# Patient Record
Sex: MALE | Race: WHITE | NOT HISPANIC OR LATINO | Employment: FULL TIME | URBAN - METROPOLITAN AREA
[De-identification: names, ages, dates, MRNs, and addresses within clinical notes are randomized per-mention and may not be internally consistent; named-entity substitution may affect disease eponyms.]

---

## 2017-01-05 ENCOUNTER — ALLSCRIPTS OFFICE VISIT (OUTPATIENT)
Dept: OTHER | Facility: OTHER | Age: 57
End: 2017-01-05

## 2017-01-05 DIAGNOSIS — M79.10 MYALGIA: ICD-10-CM

## 2017-01-10 ENCOUNTER — APPOINTMENT (OUTPATIENT)
Dept: PHYSICAL THERAPY | Facility: CLINIC | Age: 57
End: 2017-01-10
Payer: COMMERCIAL

## 2017-01-10 PROCEDURE — 97110 THERAPEUTIC EXERCISES: CPT

## 2017-01-10 PROCEDURE — 97112 NEUROMUSCULAR REEDUCATION: CPT

## 2017-01-10 PROCEDURE — 97162 PT EVAL MOD COMPLEX 30 MIN: CPT

## 2017-01-12 ENCOUNTER — APPOINTMENT (OUTPATIENT)
Dept: PHYSICAL THERAPY | Facility: CLINIC | Age: 57
End: 2017-01-12
Payer: COMMERCIAL

## 2017-01-12 PROCEDURE — 97110 THERAPEUTIC EXERCISES: CPT

## 2017-01-13 ENCOUNTER — GENERIC CONVERSION - ENCOUNTER (OUTPATIENT)
Dept: OTHER | Facility: OTHER | Age: 57
End: 2017-01-13

## 2017-01-16 ENCOUNTER — APPOINTMENT (OUTPATIENT)
Dept: PHYSICAL THERAPY | Facility: CLINIC | Age: 57
End: 2017-01-16
Payer: COMMERCIAL

## 2017-01-16 PROCEDURE — 97140 MANUAL THERAPY 1/> REGIONS: CPT

## 2017-01-16 PROCEDURE — 97110 THERAPEUTIC EXERCISES: CPT

## 2017-01-17 ENCOUNTER — APPOINTMENT (OUTPATIENT)
Dept: PHYSICAL THERAPY | Facility: CLINIC | Age: 57
End: 2017-01-17
Payer: COMMERCIAL

## 2017-01-17 PROCEDURE — 97140 MANUAL THERAPY 1/> REGIONS: CPT

## 2017-01-17 PROCEDURE — 97110 THERAPEUTIC EXERCISES: CPT

## 2017-01-18 ENCOUNTER — GENERIC CONVERSION - ENCOUNTER (OUTPATIENT)
Dept: OTHER | Facility: OTHER | Age: 57
End: 2017-01-18

## 2017-01-19 ENCOUNTER — APPOINTMENT (OUTPATIENT)
Dept: PHYSICAL THERAPY | Facility: CLINIC | Age: 57
End: 2017-01-19
Payer: COMMERCIAL

## 2017-01-19 PROCEDURE — 97140 MANUAL THERAPY 1/> REGIONS: CPT

## 2017-01-19 PROCEDURE — 97110 THERAPEUTIC EXERCISES: CPT

## 2017-01-23 ENCOUNTER — APPOINTMENT (OUTPATIENT)
Dept: PHYSICAL THERAPY | Facility: CLINIC | Age: 57
End: 2017-01-23
Payer: COMMERCIAL

## 2017-01-23 PROCEDURE — 97110 THERAPEUTIC EXERCISES: CPT

## 2017-01-23 PROCEDURE — 97140 MANUAL THERAPY 1/> REGIONS: CPT

## 2017-01-23 PROCEDURE — 97014 ELECTRIC STIMULATION THERAPY: CPT

## 2017-01-25 ENCOUNTER — APPOINTMENT (OUTPATIENT)
Dept: PHYSICAL THERAPY | Facility: CLINIC | Age: 57
End: 2017-01-25
Payer: COMMERCIAL

## 2017-01-25 PROCEDURE — 97110 THERAPEUTIC EXERCISES: CPT

## 2017-01-25 PROCEDURE — 97140 MANUAL THERAPY 1/> REGIONS: CPT

## 2017-01-25 PROCEDURE — 97014 ELECTRIC STIMULATION THERAPY: CPT

## 2017-01-26 ENCOUNTER — APPOINTMENT (OUTPATIENT)
Dept: PHYSICAL THERAPY | Facility: CLINIC | Age: 57
End: 2017-01-26
Payer: COMMERCIAL

## 2017-01-26 PROCEDURE — 97140 MANUAL THERAPY 1/> REGIONS: CPT

## 2017-01-26 PROCEDURE — 97014 ELECTRIC STIMULATION THERAPY: CPT

## 2017-01-26 PROCEDURE — 97110 THERAPEUTIC EXERCISES: CPT

## 2017-01-30 ENCOUNTER — APPOINTMENT (OUTPATIENT)
Dept: PHYSICAL THERAPY | Facility: CLINIC | Age: 57
End: 2017-01-30
Payer: COMMERCIAL

## 2017-01-30 PROCEDURE — 97140 MANUAL THERAPY 1/> REGIONS: CPT

## 2017-01-30 PROCEDURE — 97014 ELECTRIC STIMULATION THERAPY: CPT

## 2017-01-30 PROCEDURE — 97110 THERAPEUTIC EXERCISES: CPT

## 2017-01-31 ENCOUNTER — APPOINTMENT (OUTPATIENT)
Dept: PHYSICAL THERAPY | Facility: CLINIC | Age: 57
End: 2017-01-31
Payer: COMMERCIAL

## 2017-02-01 ENCOUNTER — APPOINTMENT (OUTPATIENT)
Dept: PHYSICAL THERAPY | Facility: CLINIC | Age: 57
End: 2017-02-01
Payer: COMMERCIAL

## 2017-02-01 PROCEDURE — 97110 THERAPEUTIC EXERCISES: CPT

## 2017-02-01 PROCEDURE — 97014 ELECTRIC STIMULATION THERAPY: CPT

## 2017-02-01 PROCEDURE — 97140 MANUAL THERAPY 1/> REGIONS: CPT

## 2017-02-01 RX ORDER — DIPHENOXYLATE HYDROCHLORIDE AND ATROPINE SULFATE 2.5; .025 MG/1; MG/1
1 TABLET ORAL DAILY
COMMUNITY
End: 2018-04-17 | Stop reason: ALTCHOICE

## 2017-02-02 ENCOUNTER — APPOINTMENT (OUTPATIENT)
Dept: PHYSICAL THERAPY | Facility: CLINIC | Age: 57
End: 2017-02-02
Payer: COMMERCIAL

## 2017-02-02 PROCEDURE — 97140 MANUAL THERAPY 1/> REGIONS: CPT

## 2017-02-02 PROCEDURE — 97014 ELECTRIC STIMULATION THERAPY: CPT

## 2017-02-02 PROCEDURE — 97110 THERAPEUTIC EXERCISES: CPT

## 2017-02-06 ENCOUNTER — APPOINTMENT (OUTPATIENT)
Dept: PHYSICAL THERAPY | Facility: CLINIC | Age: 57
End: 2017-02-06
Payer: COMMERCIAL

## 2017-02-06 PROCEDURE — 97110 THERAPEUTIC EXERCISES: CPT

## 2017-02-06 PROCEDURE — 97140 MANUAL THERAPY 1/> REGIONS: CPT

## 2017-02-06 PROCEDURE — 97014 ELECTRIC STIMULATION THERAPY: CPT

## 2017-02-07 ENCOUNTER — GENERIC CONVERSION - ENCOUNTER (OUTPATIENT)
Dept: OTHER | Facility: OTHER | Age: 57
End: 2017-02-07

## 2017-02-07 ENCOUNTER — HOSPITAL ENCOUNTER (OUTPATIENT)
Facility: HOSPITAL | Age: 57
Setting detail: OUTPATIENT SURGERY
Discharge: HOME | End: 2017-02-07
Attending: ANESTHESIOLOGY | Admitting: ANESTHESIOLOGY
Payer: COMMERCIAL

## 2017-02-07 ENCOUNTER — APPOINTMENT (OUTPATIENT)
Dept: RADIOLOGY | Facility: HOSPITAL | Age: 57
End: 2017-02-07
Payer: COMMERCIAL

## 2017-02-07 VITALS
HEIGHT: 71 IN | WEIGHT: 180 LBS | DIASTOLIC BLOOD PRESSURE: 97 MMHG | BODY MASS INDEX: 25.2 KG/M2 | HEART RATE: 67 BPM | TEMPERATURE: 99.6 F | RESPIRATION RATE: 18 BRPM | SYSTOLIC BLOOD PRESSURE: 141 MMHG | OXYGEN SATURATION: 99 %

## 2017-02-07 PROBLEM — M54.16 LUMBAR RADICULOPATHY: Status: ACTIVE | Noted: 2017-02-07

## 2017-02-07 PROCEDURE — 72020 X-RAY EXAM OF SPINE 1 VIEW: CPT

## 2017-02-07 RX ORDER — LIDOCAINE HYDROCHLORIDE 10 MG/ML
INJECTION, SOLUTION INFILTRATION; PERINEURAL AS NEEDED
Status: DISCONTINUED | OUTPATIENT
Start: 2017-02-07 | End: 2017-02-07 | Stop reason: HOSPADM

## 2017-02-07 RX ORDER — METHYLPREDNISOLONE ACETATE 80 MG/ML
INJECTION, SUSPENSION INTRA-ARTICULAR; INTRALESIONAL; INTRAMUSCULAR; SOFT TISSUE AS NEEDED
Status: DISCONTINUED | OUTPATIENT
Start: 2017-02-07 | End: 2017-02-07 | Stop reason: HOSPADM

## 2017-02-07 RX ORDER — METHOCARBAMOL 500 MG/1
500 TABLET, FILM COATED ORAL 3 TIMES DAILY
Status: ON HOLD | COMMUNITY
End: 2017-11-21 | Stop reason: ALTCHOICE

## 2017-02-07 RX ORDER — IBUPROFEN 800 MG/1
800 TABLET ORAL EVERY 6 HOURS PRN
Status: ON HOLD | COMMUNITY
End: 2017-11-21 | Stop reason: ALTCHOICE

## 2017-02-07 RX ORDER — GABAPENTIN 300 MG/1
300 CAPSULE ORAL 3 TIMES DAILY
COMMUNITY
End: 2018-09-04

## 2017-02-07 RX ADMIN — IOHEXOL 49.5 ML: 300 INJECTION, SOLUTION INTRAVENOUS at 12:23

## 2017-02-08 ENCOUNTER — APPOINTMENT (OUTPATIENT)
Dept: PHYSICAL THERAPY | Facility: CLINIC | Age: 57
End: 2017-02-08
Payer: COMMERCIAL

## 2017-02-08 PROCEDURE — 97110 THERAPEUTIC EXERCISES: CPT

## 2017-02-08 PROCEDURE — 97014 ELECTRIC STIMULATION THERAPY: CPT

## 2017-02-09 ENCOUNTER — APPOINTMENT (OUTPATIENT)
Dept: PHYSICAL THERAPY | Facility: CLINIC | Age: 57
End: 2017-02-09
Payer: COMMERCIAL

## 2017-02-15 ENCOUNTER — GENERIC CONVERSION - ENCOUNTER (OUTPATIENT)
Dept: OTHER | Facility: OTHER | Age: 57
End: 2017-02-15

## 2017-03-09 ENCOUNTER — CLINICAL SUPPORT (OUTPATIENT)
Dept: OTHER | Facility: OTHER | Age: 57
End: 2017-03-09

## 2017-04-07 ENCOUNTER — ALLSCRIPTS OFFICE VISIT (OUTPATIENT)
Dept: OTHER | Facility: OTHER | Age: 57
End: 2017-04-07

## 2017-04-12 ENCOUNTER — GENERIC CONVERSION - ENCOUNTER (OUTPATIENT)
Dept: OTHER | Facility: OTHER | Age: 57
End: 2017-04-12

## 2017-04-19 ENCOUNTER — ALLSCRIPTS OFFICE VISIT (OUTPATIENT)
Dept: RADIOLOGY | Facility: CLINIC | Age: 57
End: 2017-04-19
Payer: COMMERCIAL

## 2017-05-23 ENCOUNTER — ALLSCRIPTS OFFICE VISIT (OUTPATIENT)
Dept: OTHER | Facility: OTHER | Age: 57
End: 2017-05-23

## 2017-06-27 ENCOUNTER — GENERIC CONVERSION - ENCOUNTER (OUTPATIENT)
Dept: OTHER | Facility: OTHER | Age: 57
End: 2017-06-27

## 2017-07-21 ENCOUNTER — GENERIC CONVERSION - ENCOUNTER (OUTPATIENT)
Dept: OTHER | Facility: OTHER | Age: 57
End: 2017-07-21

## 2017-09-22 ENCOUNTER — ALLSCRIPTS OFFICE VISIT (OUTPATIENT)
Dept: OTHER | Facility: OTHER | Age: 57
End: 2017-09-22

## 2017-10-27 NOTE — PROGRESS NOTES
Assessment  1  DDD (degenerative disc disease), lumbar (722 52) (M51 36)   2  Lumbar radiculopathy (724 4) (M54 16)   3  Lumbar stenosis (724 02) (M48 06)   4  Myofascial pain (729 1) (M79 1)   5  Sacroiliitis (720 2) (M46 1)   6  Osteoarthritis of lumbar spine with myelopathy (721 42) (M47 16)    Plan  Lumbar radiculopathy    · Gabapentin 400 MG Oral Capsule; TAKE 1 CAPSULE 3 TIMES DAILY   Rx By: Karthik Tang; Dispense: 90 Days ; #:270 Capsule; Refill: 2;For: Lumbar radiculopathy; VEDA = N; Sent To: [facility]    40-year-old male returning for follow-up of lumbar radiculopathy and sacroiliitis  The patient has had very favorable responses to [place] size and SI joint injections in the past, however the patient feels as though his back and lower cavity symptoms are starting to return  The patient does not feel as though the pain is significant enough to repeat the injections though  He is interested in discussing medication options at this time  The patient's major complaint seems to be secondary to his lumbar radiculopathy is he does have a positive straight leg raise bilaterally  He does not have any evidence of sacroiliitis today  There may be a myofascial and facet mediated component to his low back pain however  #1 I will increase the patient's gabapentin to 400 mg 3 times a day for his neuropathic complaints  #2 the patient will continue with diclofenac 50 mg twice a day when necessary and he should avoid any other NSAIDs while on this medication  #3 the patient will continue his Zanaflex 2 mg every 8 hours when necessary for his myofascial pain  #4 I did offer the patient repeat LESI, however he would like to hold off on this for now  #5 we will repeat SI joint injections if the patient sacroiliitis returns  #6 the patient will continue with his home exercise program  #7 I will follow-up the patient in 2-3 months     Chief Complaint  1   Back Pain  Low back and leg p      History of Present Illness  68-year-old male with history of lumbar radiculopathy and sacroiliitis returning for follow-up  The patient did have bilateral SI joint injections and an interlaminar LESI with excellent relief in the past, however the patient feels as though the pain is starting to return  The patient's back pain radiates into his buttocks bilaterally and he also has associated paresthesias in his calves and feet occasionally  The patient denies any bladder or bowel incontinence or saddle anesthesia  The patient continues to take diclofenac 50 mg twice a day when necessary, Zanaflex 2 mg every 8 hours when necessary, and gabapentin 300 mg 3 times a day  The patient does get approximately 40% of relief from the pain medication regimen and he is not expressing any side effects from the medications  patient rates pain a 6-7 out of 10 and the pain is worse in the morning  The pain is intermittent and described as cramping, shooting, and pins and needles  The pain is worse with bending and twisting at the waist, walking, and standing  The pain is alleviated with sitting and lying down have personally reviewed and/or updated the patient's past medical history, past surgical history, family history, social history, allergies, and vital signs today  Other than as stated above, the patient denies any interval changes in medications, medical condition, mental condition, symptoms, or allergies since the last office visit  Anat Gamez presents with complaints of intermittent episodes of bilateral lower back pain  On a scale of 1 to 10, the patient rates the pain as 7  Symptoms are worsening  Review of Systems    Constitutional: no fever,-- no recent weight gain-- and-- no recent weight loss  Eyes: no double vision-- and-- no blurry vision  Cardiovascular: no chest pain,-- no palpitations-- and-- no lower extremity edema  Respiratory: no complaints of shortness of breath-- and-- no wheezing     Musculoskeletal: no difficulty walking,-- no muscle weakness,-- no joint stiffness,-- no joint swelling,-- no limb swelling,-- no pain in extremity-- and-- no decreased range of motion  Neurological: no dizziness,-- no difficulty swallowing,-- no memory loss,-- no loss of consciousness-- and-- no seizures  Gastrointestinal: no nausea,-- no vomiting,-- no constipation-- and-- no diarrhea  Genitourinary: no difficulty initiating urine stream,-- no genital pain-- and-- no frequent urination  Integumentary: no complaints of skin rash  Psychiatric: no depression  Endocrine: no excessive thirst,-- no adrenal disease,-- no hypothyroidism-- and-- no hyperthyroidism  Hematologic/Lymphatic: no tendency for easy bruising-- and-- no tendency for easy bleeding  ROS reviewed  Active Problems  1  Back pain (724 5) (M54 9)   2  DDD (degenerative disc disease), lumbar (722 52) (M51 36)   3  Hyperlipidemia (272 4) (E78 5)   4  Low back pain (724 2) (M54 5)   5  Lumbar radiculopathy (724 4) (M54 16)   6  Lumbar stenosis (724 02) (M48 06)   7  Myofascial pain (729 1) (M79 1)   8  Osteoarthritis of lumbar spine with myelopathy (721 42) (M47 16)   9  Sacroiliitis (720 2) (M46 1)    Past Medical History  1  No pertinent past medical history    Surgical History  1  Denied: History Of Prior Surgery    Social History   · Never a smoker    Current Meds   1  Daily Multivitamin TABS; Therapy: (Recorded:12Jan2016) to Recorded   2  Diclofenac Sodium 50 MG Oral Tablet Delayed Release; TAKE 1 TABLET PO BID prn; Therapy: 07Apr2017 to (Evaluate:23Mar2018)  Requested for: 26Jun2017; Last   CE:[id number] Ordered   3  Gabapentin 300 MG Oral Capsule; TAKE 1 CAPSULE 3 TIMES DAILY; Therapy: 31MMS9163 to (Evaluate:83Plx6901)  Requested for: 33GWN7099; Last   AW:[id number] Ordered   4  Simvastatin 20 MG Oral Tablet; TAKE 1 TABLET DAILY; Therapy: 85VOC7400 to (Evaluate:18Apr2017)  Requested for: 28OOY8885; Last   Rx:[id number] Ordered   5   TiZANidine HCl - 2 MG Oral Tablet; TAKE 1 TABLET EVERY 8 HOURS AS NEEDED; Therapy: 26YCC9747 to (Evaluate:32Ith0209)  Requested for: 33OTS2312; Last   Rx:[id number] Ordered    Allergies  1  No Known Drug Allergies    Vitals  Vital Signs    Recorded: 71QUS3440 03:30PM   Temperature 28 8 F   Systolic 039   Diastolic 74   Weight 655 lb 4 oz   BMI Calculated 25 7   BSA Calculated 2 04   Pain Scale 7     Physical Exam    Constitutional   General appearance: Well developed, well nourished, alert, in no distress, non-toxic and no overt pain behavior  Eyes   Sclera: anicteric   HEENT   Hearing grossly intact  Neck   Neck: Supple, symmetric, trachea midline, no masses  Pulmonary   Respiratory effort: Even and unlabored  Abdomen   Abdomen: Soft, non-tender, non-distended  Skin   Skin and subcutaneous tissue: Normal without rashes or lesions, well hydrated  Psychiatric   Mood and affect: Mood and affect appropriate  Neurologic   the muscle tone was normal   Musculoskeletal   Gait and station: Normal     Lumbar/Sacral Spine examination demonstrates  Bilateral lumbar paraspinals mildly tender to palpation with muscle spasms noted in the lumbar paraspinal musculature  Positive seated straight leg raise bilaterally  Negative Floyd's and Gaenslen's tests bilaterally  Bilateral lower extremity strength 5 out of 5 in all muscle groups  Results/Data  Results Free Text Form [facility]:   Results    I personally reviewed the films/images in the office today  * XR SPINE LUMBAR MINIMUM 4 VIEWS NON INJURY [id number] 11:34AM [facility] Order Number: [id number]     Test Name Result Flag Reference   XR SPINE LUMBAR MINIMUM 4 VIEWS (Report)     LUMBAR SPINE     INDICATION: Back and left SI joint pain     COMPARISON: None     VIEWS: AP, lateral, bilateral oblique and coned down projections; 5 images     FINDINGS:     Alignment is unremarkable  There is minimal dextro scoliosis       There is no radiographic evidence of acute fracture or destructive osseous lesion  No significant disc space narrowing  There is mild spondylosis with small anterior osteophytes throughout the lumbar spine and facet arthrosis primarily in the lower lumbar spine  Visualized soft tissues appear unremarkable  Mild spondylotic changes with lower lumbar facet arthrosis  Slight dextroscoliosis       Future Appointments    Date/Time Provider Specialty Site   12/18/2017 03:00 PM JOSELYN Torres Pain Management [facility] SP[facility]     Signatures   Electronically signed by : Yessica Young DO; Sep 22 2017  5:37PM EST                       (Author)

## 2017-11-06 DIAGNOSIS — M46.1 SACROILIITIS, NOT ELSEWHERE CLASSIFIED (HCC): ICD-10-CM

## 2017-11-06 DIAGNOSIS — E78.5 HYPERLIPIDEMIA: ICD-10-CM

## 2017-11-20 RX ORDER — TIZANIDINE HYDROCHLORIDE 4 MG/1
4 CAPSULE, GELATIN COATED ORAL 3 TIMES DAILY
COMMUNITY
End: 2018-03-29 | Stop reason: SDUPTHER

## 2017-11-21 ENCOUNTER — APPOINTMENT (OUTPATIENT)
Dept: RADIOLOGY | Facility: HOSPITAL | Age: 57
End: 2017-11-21
Payer: COMMERCIAL

## 2017-11-21 ENCOUNTER — APPOINTMENT (OUTPATIENT)
Dept: LAB | Facility: MEDICAL CENTER | Age: 57
End: 2017-11-21
Payer: COMMERCIAL

## 2017-11-21 ENCOUNTER — TRANSCRIBE ORDERS (OUTPATIENT)
Dept: LAB | Facility: MEDICAL CENTER | Age: 57
End: 2017-11-21

## 2017-11-21 ENCOUNTER — HOSPITAL ENCOUNTER (OUTPATIENT)
Facility: HOSPITAL | Age: 57
Setting detail: OUTPATIENT SURGERY
Discharge: HOME | End: 2017-11-21
Attending: ANESTHESIOLOGY | Admitting: ANESTHESIOLOGY
Payer: COMMERCIAL

## 2017-11-21 VITALS
RESPIRATION RATE: 18 BRPM | OXYGEN SATURATION: 96 % | SYSTOLIC BLOOD PRESSURE: 167 MMHG | HEART RATE: 73 BPM | DIASTOLIC BLOOD PRESSURE: 86 MMHG | TEMPERATURE: 98.4 F

## 2017-11-21 DIAGNOSIS — E78.5 HYPERLIPIDEMIA, UNSPECIFIED HYPERLIPIDEMIA TYPE: Primary | ICD-10-CM

## 2017-11-21 DIAGNOSIS — E78.5 HYPERLIPIDEMIA, UNSPECIFIED HYPERLIPIDEMIA TYPE: ICD-10-CM

## 2017-11-21 DIAGNOSIS — E78.5 HYPERLIPIDEMIA: ICD-10-CM

## 2017-11-21 PROBLEM — M75.51 SUBACROMIAL BURSITIS OF RIGHT SHOULDER JOINT: Status: ACTIVE | Noted: 2017-11-21

## 2017-11-21 PROBLEM — M75.52 SUBACROMIAL BURSITIS OF LEFT SHOULDER JOINT: Status: ACTIVE | Noted: 2017-11-21

## 2017-11-21 LAB
CHOLEST SERPL-MCNC: 212 MG/DL (ref 50–200)
HDLC SERPL-MCNC: 59 MG/DL (ref 40–60)
LDLC SERPL CALC-MCNC: 110 MG/DL (ref 0–100)
TRIGL SERPL-MCNC: 216 MG/DL

## 2017-11-21 PROCEDURE — 80061 LIPID PANEL: CPT

## 2017-11-21 PROCEDURE — 72020 X-RAY EXAM OF SPINE 1 VIEW: CPT

## 2017-11-21 RX ORDER — LIDOCAINE HYDROCHLORIDE 10 MG/ML
INJECTION, SOLUTION INFILTRATION; PERINEURAL AS NEEDED
Status: DISCONTINUED | OUTPATIENT
Start: 2017-11-21 | End: 2017-11-21 | Stop reason: HOSPADM

## 2017-11-21 RX ORDER — METHYLPREDNISOLONE ACETATE 80 MG/ML
INJECTION, SUSPENSION INTRA-ARTICULAR; INTRALESIONAL; INTRAMUSCULAR; SOFT TISSUE AS NEEDED
Status: DISCONTINUED | OUTPATIENT
Start: 2017-11-21 | End: 2017-11-21 | Stop reason: HOSPADM

## 2017-11-21 RX ADMIN — IOHEXOL 49 ML: 300 INJECTION, SOLUTION INTRAVENOUS at 13:02

## 2017-11-21 NOTE — DISCHARGE INSTRUCTIONS
ACTIVITY  · Do not drive or operate machinery today  · No strenuous activity today - bending, lifting, etc   · You may resume normal activites starting tomorrow - start slowly and as tolerated  · You may shower today, but no tub baths or hot tubs  · You may have numbness for several hours from the local anesthetic  Please use caution and common sense, especially with weight-bearing activities  CARE OF THE INJECTION SITE  · If you have soreness or pain, apply ice to the area today (20 minutes on/20 minutes off)  · Starting tomorrow, you may use warm, moist heat or ice if needed  · You may have an increase or change in your discomfort for 36-48 hours after your treatment  · Apply ice and continue with any pain medication you have been prescribed  · Notify the [facility] if you have any of the following: redness, drainage, swelling, headache, stiff neck or fever above 100°F     SPECIAL INSTRUCTIONS  · Our office will contact you in approximately 7 days for a progress report  MEDICATIONS  · Continue to take all routine medications  · Our office may have instructed you to hold some medications  If you have a problem specifically related to your procedure, please call our office at [phone]  Problems not related to your procedure should be directed to your primary care physician

## 2017-11-21 NOTE — OP NOTE
OPERATIVE REPORT  PATIENT NAME: Humble Gilbert    :  1960  MRN: [id number]  Pt Location: [facility] OR ROOM 01    SURGERY DATE: 2017    Surgeon(s) and Role:     * DO Fermin Nichols Primary    Preop Diagnosis:  Radiculopathy of lumbar region [M54 16]    Post-Op Diagnosis Codes:     * Radiculopathy of lumbar region [M54 16]    Procedure(s) (LRB):  LUMBAR EPIDURAL STEROID INJECTION (N/A)    Specimen(s):  * No specimens in log *    Estimated Blood Loss:   Minimal    Drains:       Anesthesia Type:   Local    Operative Indications:  Radiculopathy of lumbar region [M54 16]      Operative Findings:  None    Complications:   None    Procedure and Technique:  Fluoroscopically-guided lumbar Interlaminar Epidural Steroid Injection     Indication:  Low back and leg pain  Preoperative diagnosis:  Lumbar radiculitis  Postoperative diagnosis:  Lumbar radiculitis    Procedure: Fluoroscopically-guided L5-S1 interlaminar epidural steroid injection under fluoroscopy      After discussing the risks, benefits, and alternatives to the procedure, the patient expressed understanding and wished to proceed  The patient was brought to the fluoroscopy suite and placed in the prone position  A procedural pause was conducted to verify:  correct patient identity, procedure to be performed and as applicable, correct side and site, correct patient position, and availability of implants, special equipment and special requirements  After identifying the L5-S1 space fluoroscopically, the skin was sterilely prepped and draped in the usual fashion using Chloraprep skin prep  The skin and subcutaneous tissue were anesthetized with 1 % lidocaine  Utilizing a loss of resistance technique and intermittent fluoroscopic guidance, a 3 5 20 gauge Tuohy needle was advanced into the epidural space  Proper needle positioning was confirmed using multiple fluoroscopic views    After negative aspiration, Omnipaque 300 contrast was injected confirming epidural spread without evidence of intravascular or intrathecal spread  A 4 ml solution consisting of 80 mg of Depo-Medrol in sterile saline was injected slowly and incrementally into the epidural space  Following the injection the needle was withdrawn slightly and flushed with 1 % lidocaine as it was fully extracted  The patient tolerated the procedure well and there were no apparent complications  After appropriate observation, the patient was dismissed from the clinic in good condition under their own power               I was present for the entire procedure    Patient Disposition:  PACU     SIGNATURE: Dino Perera DO  DATE: November 21, 2017  TIME: 1:11 PM

## 2017-12-01 ENCOUNTER — GENERIC CONVERSION - ENCOUNTER (OUTPATIENT)
Dept: OTHER | Facility: OTHER | Age: 57
End: 2017-12-01

## 2017-12-18 ENCOUNTER — ALLSCRIPTS OFFICE VISIT (OUTPATIENT)
Dept: OTHER | Facility: OTHER | Age: 57
End: 2017-12-18

## 2017-12-21 ENCOUNTER — ALLSCRIPTS OFFICE VISIT (OUTPATIENT)
Dept: FAMILY MEDICINE CLINIC | Facility: CLINIC | Age: 57
End: 2017-12-21
Payer: COMMERCIAL

## 2017-12-21 PROCEDURE — 99396 PREV VISIT EST AGE 40-64: CPT | Performed by: FAMILY MEDICINE

## 2017-12-23 NOTE — PROGRESS NOTES
Assessment   1  Encounter for preventive health examination (V70 0) (Z00 00)    Chief Complaint   Chief Complaint Free Text Note Form: Patient was seen for a home visit      History of Present Illness   HPI: Pt is her efor well check up and health wellness visit for the year He is eating well and sleeping well and bm are ok denies any chest pain or sob and had colonoscopy and seeing pain mgmt for injections for djdin back      Review of Systems   ROS Reviewed:    ROS reviewed  Active Problems   1  Chronic bilateral low back pain (724 2,338 29) (M54 5,G89 29)   2  DDD (degenerative disc disease), lumbar (722 52) (M51 36)   3  Hyperlipidemia (272 4) (E78 5)   4  Lumbar radiculopathy (724 4) (M54 16)   5  Lumbar stenosis (724 02) (M48 061)   6  Myofascial pain (729 1) (M79 1)   7  Osteoarthritis of lumbar spine with myelopathy (721 42) (M47 16)   8  Sacroiliitis (720 2) (M46 1)    Past Medical History   1  History of back pain (V13 59) (Z87 39)   2  No pertinent past medical history    Surgical History   1  Denied: History Of Prior Surgery  Surgical History Reviewed: The surgical history was reviewed and updated today  Social History    · Never a smoker  Social History Reviewed: The social history was reviewed and updated today  Current Meds    1  Daily Multivitamin TABS; Therapy: (Recorded:12Jan2016) to Recorded   2  Diclofenac Sodium 50 MG Oral Tablet Delayed Release; TAKE 1 TABLET PO BID prn; Therapy: 07Apr2017 to (Evaluate:16Jun2018)  Requested for: 16CUB5039; Last     Rx:[id number] Ordered   3  Gabapentin 400 MG Oral Capsule; TAKE 1 CAPSULE 3 TIMES DAILY; Therapy: 10EJR1184 to (Evaluate:16Jun2018)  Requested for: 59ZOD6666; Last     Rx:[id number] Ordered   4  Simvastatin 20 MG Oral Tablet; TAKE 1 TABLET DAILY; Therapy: 10XTW0327 to (Evaluate:18Apr2017)  Requested for: 97XTN8547; Last     Rx:[id number] Ordered   5  TiZANidine HCl - 2 MG Oral Tablet;  Take 1/2-1 tab BID PRN for spasms; Therapy: 07Apr2017 to (Evaluate:18Mar2018)  Requested for: 20VZT0315; Last     Rx:[id number] Ordered  Medication List Reviewed: The medication list was reviewed and updated today  Allergies   1  No Known Drug Allergies    Vitals   Signs   Recorded: 21Dec2017 10:25AM   Temperature: 98 7 F  Heart Rate: 86  Respiration: 20  Systolic: 079  Diastolic: 76    Physical Exam        Constitutional      General appearance: No acute distress, well appearing and well nourished  Eyes      Conjunctiva and lids: No swelling, erythema, or discharge  Pupils and irises: Equal, round and reactive to light  Ears, Nose, Mouth, and Throat      External inspection of ears and nose: Normal        Otoscopic examination: Tympanic membrance translucent with normal light reflex  Canals patent without erythema  Nasal mucosa, septum, and turbinates: Normal without edema or erythema  Oropharynx: Normal with no erythema, edema, exudate or lesions  Pulmonary      Respiratory effort: No increased work of breathing or signs of respiratory distress  Auscultation of lungs: Clear to auscultation, equal breath sounds bilaterally, no wheezes, no rales, no rhonci  Cardiovascular      Palpation of heart: Normal PMI, no thrills  Auscultation of heart: Normal rate and rhythm, normal S1 and S2, without murmurs  Examination of extremities for edema and/or varicosities: Normal        Carotid pulses: Normal        Abdomen      Abdomen: Non-tender, no masses  Liver and spleen: No hepatomegaly or splenomegaly  Lymphatic      Palpation of lymph nodes in neck: No lymphadenopathy  Musculoskeletal      Gait and station: Normal        Digits and nails: Normal without clubbing or cyanosis  Inspection/palpation of joints, bones, and muscles: Normal        Skin      Skin and subcutaneous tissue: Normal without rashes or lesions         Neurologic      Cranial nerves: Cranial nerves 2-12 intact  Reflexes: 2+ and symmetric  Sensation: No sensory loss  Psychiatric      Orientation to person, place and time: Normal        Mood and affect: Normal           Discussion/Summary   Discussion Summary:    Reviewed albs and discussed diet and exercise and doing well no changes        Future Appointments      Date/Time Provider Specialty Site   04/09/2018 03:00 PM Josefina Galloway, DO Pain Management [facility] SPINE     Signatures    Electronically signed by : Doreen Buckner, Trey Portillo; Dec 21 2017 11:33AM EST                       (Author)     Electronically signed by : Edvin Ludwig MD; Dec 22 2017  2:12PM EST                       (Author)

## 2018-01-09 NOTE — MISCELLANEOUS
Message   Recorded as Task   Date: 02/15/2017 10:10 AM, Created By: Brian Raman   Task Name: Call Back   Assigned To: [facility]   Regarding Patient: Rita Nolasco, Status: Active   Comment:    Brian Raman - 15 Feb 2017 10:10 AM     TASK CREATED  S/P L RIMMA DONE 2/7/17 @ 'S BY Macie Santiago - 15 Feb 2017 10:41 AM     TASK EDITED  Called 8 days post-op, patient unavailable, spoke with wife, she states that the patient is doing much better, no problems or issues  She said that he stopped outpatient physical therapy and is doing a home exercise program on a daily basis  He is scheduled for a follow up apt on 3/9/17 in [place] w/ [address] [place] [address]  She will have him call the office with any questions or concerns prior to his appt  She was appreciative of the call  Active Problems    1  Back pain (724 5) (M54 9)   2  DDD (degenerative disc disease), lumbar (722 52) (M51 36)   3  Hyperlipidemia (272 4) (E78 5)   4  Low back pain (724 2) (M54 5)   5  Lumbar radiculopathy (724 4) (M54 16)   6  Lumbar stenosis (724 02) (M48 06)   7  Myofascial pain (729 1) (M79 1)   8  Osteoarthritis of lumbar spine with myelopathy (721 42) (M47 16)    Current Meds   1  Daily Multivitamin TABS; Therapy: (Recorded:12Jan2016) to Recorded   2  Gabapentin 300 MG Oral Capsule; TAKE 1 CAPSULE 3 TIMES DAILY; Therapy: 96ISN2727 to (Natali Wood)  Requested for: 68UAR4737; Last   Rx:05Jan2017 Ordered   3  Ibuprofen 800 MG Oral Tablet; 1 po tid prn; Therapy: 89EMW8361 to (Last Rx:05Jan2017)  Requested for: 95KBO0439 Ordered   4  Simvastatin 20 MG Oral Tablet; TAKE 1 TABLET DAILY; Therapy: 66GMI4407 to (Evaluate:18Apr2017)  Requested for: 47SQJ8615; Last   Rx:[id number] Ordered    Allergies    1   No Known Drug Allergies    Signatures   Electronically signed by : Emi Tomas, ; Feb 15 2017 10:41AM EST                       (Author)

## 2018-01-10 NOTE — PROGRESS NOTES
Assessment    1  Encounter for preventive health examination (V70 0) (Z00 00)    Plan  Back pain    · Meloxicam 7 5 MG Oral Tablet; TAKE 1 TO 2 TABLETS DAILY  Health Maintenance    · Carisoprodol 250 MG Oral Tablet; TAKE 1 TABLET 4 TIMES DAILY    Discussion/Summary  Impression: health maintenance visit  Currently, he eats a healthy diet  Prostate cancer screening: the risks and benefits of prostate cancer screening were discussed  Pt is healthy and will et screening bw for chol total and also ldl , hdl and triglycerides ht and weight good no new meds and will cont antiinflammatory if needed  Chief Complaint  Pt presents as a new pt for a wellness visit  Pt has no present complaints  Pt is requesting a script for fasting bloodwork  History of Present Illness  HM, Adult Male: The patient is being seen for a health maintenance evaluation  The last health maintenance visit was 1 year ago year(s) ago  General Health: The patient's health since the last visit is described as good  He has regular dental visits  He denies vision problems  He denies hearing loss  Immunizations status: up to date  Lifestyle:  He consumes a diverse and healthy diet  He does not have any weight concerns  He does not use tobacco  He consumes alcohol  He reports occasional alcohol use  He typically drinks beer, but no wine consumption and no hard liquor consumption  Alcohol concern: The patient has no concerns about alcohol abuse  He denies drug use  Reproductive health:  the patient is sexually active  birth control is not being practiced  He denies erectile dysfunction  Screening: cancer screening reviewed and current  metabolic screening reviewed and current  risk screening reviewed and current     HPI: pt is here for checkup and feeling good appetite is ok and sleeps ok and bm are ok no chest pain or sob      Review of Systems    Constitutional: No fever or chills, feels well, no tiredness, no recent weight gain or weight loss  Eyes: No complaints of eye pain, no red eyes, no discharge from eyes, no itchy eyes  ENT: no complaints of earache, no hearing loss, no nosebleeds, no nasal discharge, no sore throat, no hoarseness  Cardiovascular: No complaints of slow heart rate, no fast heart rate, no chest pain, no palpitations, no leg claudication, no lower extremity  Respiratory: No complaints of shortness of breath, no wheezing, no cough, no SOB on exertion, no orthopnea or PND  Gastrointestinal: No complaints of abdominal pain, no constipation, no nausea or vomiting, no diarrhea or bloody stools  Genitourinary: No complaints of dysuria, no incontinence, no hesitancy, no nocturia, no genital lesion, no testicular pain  Musculoskeletal: No complaints of arthralgia, no myalgias, no joint swelling or stiffness, no limb pain or swelling  Integumentary: No complaints of skin rash or skin lesions, no itching, no skin wound, no dry skin  Neurological: No compliants of headache, no confusion, no convulsions, no numbness or tingling, no dizziness or fainting, no limb weakness, no difficulty walking  Psychiatric: Is not suicidal, no sleep disturbances, no anxiety or depression, no change in personality, no emotional problems  Endocrine: No complaints of proptosis, no hot flashes, no muscle weakness, no erectile dysfunction, no deepening of the voice, no feelings of weakness  Hematologic/Lymphatic: No complaints of swollen glands, no swollen glands in the neck, does not bleed easily, no easy bruising  ROS reviewed  Active Problems    1  Back pain (724 5) (M54 9)   2  DDD (degenerative disc disease), lumbar (722 52) (M51 36)   3  Low back pain (724 2) (M54 5)    Past Medical History    · No pertinent past medical history    Surgical History    · Denied: History Of Prior Surgery    Social History    · Never a smoker    Current Meds   1  Daily Multivitamin TABS; Therapy: (Recorded:12Jan2016) to Recorded   2  Meloxicam 7 5 MG Oral Tablet; TAKE 1 TABLET DAILY; Therapy: (Recorded:32Gwb5589) to Recorded   3  Turmeric Curcumin CAPS; Therapy: (Recorded:88Ddp9832) to Recorded    Allergies    1  No Known Drug Allergies    Vitals   Recorded: 40JRR9969 04:37PM   Heart Rate 82   Respiration 18   Systolic 342   Diastolic 78   Height 5 ft 11 in   Weight 181 lb    BMI Calculated 25 24   BSA Calculated 2 02   O2 Saturation 98     Physical Exam    Constitutional   General appearance: No acute distress, well appearing and well nourished  Eyes   Conjunctiva and lids: No erythema, swelling or discharge  Pupils and irises: Equal, round, reactive to light  Ophthalmoscopic examination: Normal fundi and optic discs  Ears, Nose, Mouth, and Throat   External inspection of ears and nose: Normal     Otoscopic examination: Tympanic membranes translucent with normal light reflex  Canals patent without erythema  Hearing: Normal     Nasal mucosa, septum, and turbinates: Normal without edema or erythema  Lips, teeth, and gums: Normal, good dentition  Oropharynx: Normal with no erythema, edema, exudate or lesions  Neck   Neck: Supple, symmetric, trachea midline, no masses  Thyroid: Normal, no thyromegaly  Pulmonary   Respiratory effort: No increased work of breathing or signs of respiratory distress  Percussion of chest: Normal     Palpation of chest: Normal     Auscultation of lungs: Clear to auscultation  Cardiovascular   Palpation of heart: Normal PMI, no thrills  Auscultation of heart: Normal rate and rhythm, normal S1 and S2, no murmurs  Carotid pulses: 2+ bilaterally  Abdominal aorta: Normal     Femoral pulses: 2+ bilaterally  Pedal pulses: 2+ bilaterally  Examination of extremities for edema and/or varicosities: Normal     Chest   Breasts: Normal, no dimpling or skin changes appreciated  Palpation of breasts and axillae: Normal, no masses palpated      Chest: Normal     Abdomen   Abdomen: Non-tender, no masses  Liver and spleen: No hepatomegaly or splenomegaly  Examination for hernias: No hernias appreciated  Anus, perineum, and rectum: Normal sphincter tone, no masses, no prolapse  Stool sample for occult blood: Negative  Genitourinary   Scrotal contents: Normal testes, no masses  Penis: Normal, no lesions  Digital rectal exam of prostate: Normal size, no masses  Lymphatic   Palpation of lymph nodes in neck: No lymphadenopathy  Palpation of lymph nodes in axillae: No lymphadenopathy  Palpation of lymph nodes in groin: No lymphadenopathy  Palpation of lymph nodes in other areas: No lymphadenopathy  Musculoskeletal   Gait and station: Normal     Inspection/palpation of digits and nails: Normal without clubbing or cyanosis  Inspection/palpation of joints, bones, and muscles: Normal     Range of motion: Normal     Stability: Normal     Muscle strength/tone: Normal     Skin   Skin and subcutaneous tissue: Normal without rashes or lesions  Palpation of skin and subcutaneous tissue: Normal turgor  Neurologic   Cranial nerves: Cranial nerves 2-12 intact  Reflexes: 2+ and symmetric  Sensation: No sensory loss  Psychiatric   Judgment and insight: Normal     Orientation to person, place and time: Normal     Recent and remote memory: Intact      Mood and affect: Normal        Signatures   Electronically signed by : Trey Bauer; May  4 2016  7:52PM EST                       (Author)    Electronically signed by : Oksana Rivera MD; May  6 2016 11:56AM EST                       (Author)

## 2018-01-11 NOTE — PROGRESS NOTES
Assessment    1  Sacroiliitis (720 2) (M46 1)   2  Lumbar radiculopathy (724 4) (M54 16)   3  Lumbar stenosis (724 02) (M48 06)   4  Myofascial pain (729 1) (M79 1)   5  Osteoarthritis of lumbar spine with myelopathy (721 42) (M47 16)   6  DDD (degenerative disc disease), lumbar (722 52) (M51 36)    Plan  Back pain, DDD (degenerative disc disease), lumbar    · Procedure Flowsheet; Status:Complete - Retrospective Authorization;   Done: 19MBW5543  03:23PM   Performed: In Office; 21 ; Last Updated By:Cliff Ward; 3/9/2017 3:24:00 PM;Ordered; For:Back pain, DDD (degenerative disc disease), lumbar; Ordered By:Jewel Harvey;  Lumbar radiculopathy    · Ibuprofen 800 MG Oral Tablet   Rx By: Tyra Goodwin; Dispense: 0 Days ; #:90 Tablet; Refill: 3; For: Lumbar radiculopathy; VEDA = N; Sent To: [address] [place] [address]; Last Updated By: Olga Gómez; 3/9/2017 3:54:02 PM   · Gabapentin 300 MG Oral Capsule; TAKE 1 CAPSULE 3 TIMES DAILY   Rx By: Olga Gómez; Dispense: 30 Days ; #:90 Capsule; Refill: 2; For: Lumbar radiculopathy; VEDA = N; Sent To: [facility]  Myofascial pain    · Methocarbamol 500 MG Oral Tablet; take 1 tablet tid prn   Rx By: Olga Gómez; Dispense: 30 Days ; #:90 Tablet; Refill: 1; For: Myofascial pain; VEDA = N; Record  Sacroiliitis    · Vimovo 500-20 MG Oral Tablet Delayed Release   Rx By: Olga Gómez; Dispense: 30 Days ; #:60 Tablet Delayed Release; Refill: 1; For: Sacroiliitis; VEDA = N; Sent To: [facility]   · Vimovo 500-20 MG Oral Tablet Delayed Release; TAKE 1 TABLET Twice daily PRN   Rx By: Olga Gómez; Dispense: 30 Days ; #:60 Tablet Delayed Release; Refill: 1; For: Sacroiliitis; VEDA = N; Verified Transmission to [facility] [place]; Last Updated By: System, NOZA; 3/9/2017 3:56:50 PM    57-year-old male returning for follow-up of lumbar sacral back pain and lumbar radiculopathy into the left lower extremity   The patient notes pain that was radiating down the posterior aspect of left lower extremity to his foot with some associated paresthesias  The patient states that this pain has been significantly relieved with interlaminar lumbar epidural steroid injection performed on February 7 2017  He does have some residual lumbosacral back pain that radiates into his bilateral buttocks and posterior aspects of his thighs which seems to be secondary to sacroiliitis  He does have muscle spasms in his lumbar paraspinals and there does seem to be a myofascial component to his pain  #1 the patient will continue with his exercises taught to him a physical therapy as he did note relief from this  #2 I will discontinue ibuprofen and trial the patient on Vimovo twice a day when necessary  I advised patient to take this medication with food and to avoid any other NSAIDs while on this medication  #3 the patient will continue with gabapentin 300 mg 3 times a day for his neuropathic complaints  #4 the patient will continue with Robaxin 500 mg every 8 hours when necessary for his myofascial complaints  #5 if the patient fails conservative therapy we will consider bilateral SI joint injections  #6 if the patient's radicular symptoms into the left lower extremity return we will repeat interlaminar lumbar epidural steroid injection when necessary  #7 I will follow-up with the patient in 4 weeks     Chief Complaint    1  Back Pain  Back pain      History of Present Illness  13-year-old male returning for follow-up of lumbosacral back pain and lumbar radiculopathy into the left lower extremity down the posterior aspect into the plantar aspect of his left foot With some associated numbness in his toes  The patient is status post interlaminar lumbar epidural steroid injection February 7, 2017 with significant improvement in his low back pain and left lower extremity symptoms   The patient states that the pain and symptoms are essentially gone when he is standing and walking, however he still experiences a lot of lumbar sacral back pain that is radiating into the posterior aspects of bilateral lower extremities down to the knees  There is no associated numbness, paresthesias, or subjective weakness  The patient is currently taking gabapentin 300 mg 3 times a day, Robaxin 500 mg every 8 hours when necessary, and ibuprofen 800 mg every 8 hours when necessary with mild to moderate relief  The patient does have multilevel degenerative disc disease and spondylosis as well as central and foraminal stenosis on MRI of his lumbar spine in 2012  The patient rates his pain a 2 out of 10 and the pain is worse in the morning  The pain is intermittent and described as burning, sharp, and shooting  The pain is worse with sitting and bending and twisting at the waist  The pain is alleviated with standing, walking, injections, NSAIDs, muscle relaxants, and gabapentin  I have personally reviewed and/or updated the patient's past medical history, past surgical history, family history, social history, allergies, and vital signs today  Other than as stated above, the patient denies any interval changes in medications, medical condition, mental condition, symptoms, or allergies since the last office visit  Yifan Arias presents with complaints of intermittent episodes of bilateral lower back pain, described as sharp, radiating to the bilateral thigh and bilateral lower leg  On a scale of 1 to 10, the patient rates the pain as 2  Symptoms are unchanged  Review of Systems    Constitutional: no fever, no recent weight gain and no recent weight loss  Eyes: no double vision and no blurry vision  Cardiovascular: no chest pain, no palpitations and no lower extremity edema  Respiratory: no complaints of shortness of breath and no wheezing  Musculoskeletal: decreased range of motion, but no difficulty walking, no muscle weakness, no joint stiffness, no joint swelling, no limb swelling and no pain in extremity  Neurological: no dizziness, no difficulty swallowing, no memory loss, no loss of consciousness and no seizures  Gastrointestinal: no nausea, no vomiting, no constipation and no diarrhea  Genitourinary: no difficulty initiating urine stream, no genital pain and no frequent urination  Integumentary: no complaints of skin rash  Psychiatric: no depression  Endocrine: no excessive thirst, no adrenal disease, no hypothyroidism and no hyperthyroidism  Hematologic/Lymphatic: no tendency for easy bruising and no tendency for easy bleeding  ROS reviewed  Active Problems    1  Back pain (724 5) (M54 9)   2  DDD (degenerative disc disease), lumbar (722 52) (M51 36)   3  Hyperlipidemia (272 4) (E78 5)   4  Low back pain (724 2) (M54 5)   5  Lumbar radiculopathy (724 4) (M54 16)   6  Lumbar stenosis (724 02) (M48 06)   7  Myofascial pain (729 1) (M79 1)   8  Osteoarthritis of lumbar spine with myelopathy (721 42) (M47 16)    Past Medical History    1  No pertinent past medical history    Surgical History    1  Denied: History Of Prior Surgery    Social History    · Never a smoker    Current Meds   1  Daily Multivitamin TABS; Therapy: (Recorded:12Jan2016) to Recorded   2  Gabapentin 300 MG Oral Capsule; TAKE 1 CAPSULE 3 TIMES DAILY; Therapy: 33YAW0698 to ([place] )  Requested for: 49LCI2261; Last   Rx:05Jan2017 Ordered   3  Ibuprofen 800 MG Oral Tablet; 1 po tid prn; Therapy: 28FSU6886 to (Last Rx:05Jan2017)  Requested for: 88ANY2103 Ordered   4  Methocarbamol 500 MG Oral Tablet; take 1 tablet tid prn; Therapy: (Recorded:09Mar2017) to Recorded   5  Simvastatin 20 MG Oral Tablet; TAKE 1 TABLET DAILY; Therapy: 74ECZ5949 to (Evaluate:18Apr2017)  Requested for: 97UUX0744; Last   Rx:07Yrc2692 Ordered    Allergies    1   No Known Drug Allergies    Vitals  Vital Signs    Recorded: 10OFW7196 03:17PM   Temperature 95 5 F   Systolic 678   Diastolic 86   Height 5 ft 11 in   Weight 186 lb 4 oz   BMI Calculated 25 98   BSA Calculated 2 05   Pain Scale 2     Physical Exam    Constitutional   General appearance: Well developed, well nourished, alert, in no distress, non-toxic and no overt pain behavior  Eyes   Sclera: anicteric   HEENT   Hearing grossly intact  Neck   Neck: Supple, symmetric, trachea midline, no masses  Pulmonary   Respiratory effort: Even and unlabored  Abdomen   Abdomen: Soft, non-tender, non-distended  Skin   Skin and subcutaneous tissue: Normal without rashes or lesions, well hydrated  Psychiatric   Mood and affect: Mood and affect appropriate  Neurologic   the muscle tone was normal   Musculoskeletal   Gait and station: Normal     Lumbar/Sacral Spine examination demonstrates  Bilateral lumbar paraspinals and SI joints tender to palpation  Negative seated straight leg raise bilaterally  Bilateral lower extremity strength 5 out of 5 in all muscle groups  Positive Floyd's test on the left and equivocal on the right  Results/Data  Procedure Flowsheet 23YXB9074 03:23PM Carmela Echevarria     Test Name Result Flag Reference   Oswestry Score 28       Results Free Text Form [facility]:   Results    I personally reviewed the films/images in the office today  MRI:  Lumbar spine dated 2/20/2012 Impression: Multilevel degenerative changes with area of moderate to advanced central canal stenosis as described in full report  * XR SPINE LUMBAR MINIMUM 4 VIEWS NON INJURY [id number] 11:34AM Tiffanyhelen Lawanda Order Number: [id number]     Test Name Result Flag Reference   XR SPINE LUMBAR MINIMUM 4 VIEWS (Report)     LUMBAR SPINE     INDICATION: Back and left SI joint pain     COMPARISON: None     VIEWS: AP, lateral, bilateral oblique and coned down projections; 5 images     FINDINGS:     Alignment is unremarkable  There is minimal dextro scoliosis  There is no radiographic evidence of acute fracture or destructive osseous lesion       No significant disc space narrowing  There is mild spondylosis with small anterior osteophytes throughout the lumbar spine and facet arthrosis primarily in the lower lumbar spine  Visualized soft tissues appear unremarkable  Mild spondylotic changes with lower lumbar facet arthrosis  Slight dextroscoliosis       Signatures   Electronically signed by : Stephan Aguilar DO; Mar  9 2017  4:09PM EST                       (Author)

## 2018-01-12 VITALS
TEMPERATURE: 98.4 F | HEIGHT: 71 IN | WEIGHT: 186.25 LBS | BODY MASS INDEX: 26.07 KG/M2 | SYSTOLIC BLOOD PRESSURE: 160 MMHG | DIASTOLIC BLOOD PRESSURE: 86 MMHG

## 2018-01-12 VITALS
BODY MASS INDEX: 26.53 KG/M2 | DIASTOLIC BLOOD PRESSURE: 70 MMHG | SYSTOLIC BLOOD PRESSURE: 140 MMHG | TEMPERATURE: 97.8 F | WEIGHT: 189.5 LBS | HEIGHT: 71 IN

## 2018-01-12 VITALS
SYSTOLIC BLOOD PRESSURE: 122 MMHG | BODY MASS INDEX: 25.7 KG/M2 | TEMPERATURE: 98.3 F | DIASTOLIC BLOOD PRESSURE: 74 MMHG | WEIGHT: 184.25 LBS

## 2018-01-13 VITALS
DIASTOLIC BLOOD PRESSURE: 86 MMHG | SYSTOLIC BLOOD PRESSURE: 148 MMHG | TEMPERATURE: 97.6 F | WEIGHT: 188.13 LBS | HEIGHT: 71 IN | BODY MASS INDEX: 26.34 KG/M2

## 2018-01-13 NOTE — MISCELLANEOUS
Message   Recorded as Task   Date: 01/17/2017 03:27 PM, Created By: Diane Uribe   Task Name: Miscellaneous   Assigned To: Rita Ward   Regarding Patient: Anthony Gross, Status: Active   CommentRitika Rao - 17 Jan 2017 3:27 PM     TASK CREATED  For prior authorization for procedure LESI, insurance would like to know what level you are doing to the procedure  Please advise  Thank you  Luciana Duenas - 17 Jan 2017 4:42 PM     TASK REPLIED TO: Previously Assigned To Luciana Duenas                      The level will be L5-S1        Active Problems    1  Back pain (724 5) (M54 9)   2  DDD (degenerative disc disease), lumbar (722 52) (M51 36)   3  Hyperlipidemia (272 4) (E78 5)   4  Low back pain (724 2) (M54 5)   5  Lumbar radiculopathy (724 4) (M54 16)   6  Lumbar stenosis (724 02) (M48 06)   7  Myofascial pain (729 1) (M79 1)   8  Osteoarthritis of lumbar spine with myelopathy (721 42) (M47 16)    Current Meds   1  Daily Multivitamin TABS; Therapy: (Recorded:12Jan2016) to Recorded   2  Gabapentin 300 MG Oral Capsule; TAKE 1 CAPSULE 3 TIMES DAILY; Therapy: 55MON1512 to (Judy Ramachandran)  Requested for: 25DZU6648; Last   Rx:05Jan2017 Ordered   3  Ibuprofen 800 MG Oral Tablet; 1 po tid prn; Therapy: 36BCZ2296 to (Last Rx:05Jan2017)  Requested for: 59CGI6414 Ordered   4  Simvastatin 20 MG Oral Tablet; TAKE 1 TABLET DAILY; Therapy: 43KOH9245 to (Evaluate:18Apr2017)  Requested for: 10CDS1921; Last   Rx:88Lgo7577 Ordered    Allergies    1   No Known Drug Allergies    Signatures   Electronically signed by : Ventura Doss, ; Jan 18 2017  7:45AM EST                       (Author)

## 2018-01-14 VITALS
HEIGHT: 71 IN | SYSTOLIC BLOOD PRESSURE: 138 MMHG | BODY MASS INDEX: 25.62 KG/M2 | TEMPERATURE: 98.2 F | DIASTOLIC BLOOD PRESSURE: 88 MMHG | WEIGHT: 183 LBS

## 2018-01-15 NOTE — MISCELLANEOUS
Message   Recorded as Task   Date: 04/12/2017 10:14 AM, Created By: Pia Lyman   Task Name: Care Coordination   Assigned To: Rita Ward   Regarding Patient: Emerson Price, Status: In Progress   Comment:    Rita Ward - 12 Apr 2017 10:14 AM     TASK CREATED  Per fax from [facility] inj is denied because it is not medically necessary  States that "there are tsts that the dr can do on exam to see if pain is coming from [facility] joint, at least 3 tests must be positive  Please advise  Ministerio Lee - 12 Apr 2017 2:05 PM     TASK REPLIED TO: Previously Assigned To Ministerio Lee                      Addendum was made on 2 the plan of the most recent office visit regarding tests I forgot to document performed on the patient  Please notify insurance company of updated note   Pia Lyman - 12 Apr 2017 3:10 PM     TASK IN PROGRESS   Pia Lyman - 12 Apr 2017 3:11 PM     TASK EDITED  printed addendum, faxed updated note to [facility], awaiting response  Active Problems    1  Back pain (724 5) (M54 9)   2  DDD (degenerative disc disease), lumbar (722 52) (M51 36)   3  Hyperlipidemia (272 4) (E78 5)   4  Low back pain (724 2) (M54 5)   5  Lumbar radiculopathy (724 4) (M54 16)   6  Lumbar stenosis (724 02) (M48 06)   7  Myofascial pain (729 1) (M79 1)   8  Osteoarthritis of lumbar spine with myelopathy (721 42) (M47 16)   9  Sacroiliitis (720 2) (M46 1)    Current Meds   1  Daily Multivitamin TABS; Therapy: (Recorded:12Jan2016) to Recorded   2  Diclofenac Sodium 50 MG Oral Tablet Delayed Release; TAKE 1 TABLET PO BID prn; Therapy: 07Apr2017 to (Evaluate:02Jan2018)  Requested for: 02Xma7270; Last   Rx:07Apr2017 Ordered   3  Gabapentin 300 MG Oral Capsule; TAKE 1 CAPSULE 3 TIMES DAILY; Therapy: 03TLE9544 to (Evaluate:07Jun2017)  Requested for: 20UEB8469; Last   Rx:09Mar2017 Ordered   4  Simvastatin 20 MG Oral Tablet; TAKE 1 TABLET DAILY;    Therapy: 65LUK8846 to (Evaluate:18Apr2017)  Requested for: 73CAF4662; Last   Rx:71Wbk4421 Ordered   5  TiZANidine HCl - 2 MG Oral Tablet; TAKE 1 TABLET EVERY 8 HOURS AS NEEDED; Therapy: 38LQF0750 to (Evaluate:75Sah6988)  Requested for: 07Apr2017; Last   Rx:07Apr2017 Ordered    Allergies    1   No Known Drug Allergies    Signatures   Electronically signed by : Chasidy Mensah, ; Apr 12 2017  3:11PM EST                       (Author)

## 2018-01-15 NOTE — MISCELLANEOUS
Message   Recorded as Task   Date: 06/26/2017 02:25 PM, Created By: Lavon Santana   Task Name: Call Back   [facility]   Regarding Patient: Gm Call, Status: Active   Comment:    Seema Lopez - 26 Jun 2017 2:25 PM     TASK CREATED  [address]- patients wife Brendan Davila called stating that patient need scripts (everything that the doctor have him on) [phone]  Stated patient will no longer be going to the pharmacy to  scripts   therefore would like new scripts sent  Please call patient back  - 26 Jun 2017 3:50 PM     TASK EDITED  Spoke with pt and he needs Gabapentin 300mg and Diclofenac 50mg refilled [phone]   Rupinder Mensah - 26 Jun 2017 4:12 PM     TASK REPLIED TO: Previously Assigned To Jewel Harvey                Refill sent to pharmacy        Active Problems    1  Back pain (724 5) (M54 9)   2  DDD (degenerative disc disease), lumbar (722 52) (M51 36)   3  Hyperlipidemia (272 4) (E78 5)   4  Low back pain (724 2) (M54 5)   5  Lumbar radiculopathy (724 4) (M54 16)   6  Lumbar stenosis (724 02) (M48 06)   7  Myofascial pain (729 1) (M79 1)   8  Osteoarthritis of lumbar spine with myelopathy (721 42) (M47 16)   9  Sacroiliitis (720 2) (M46 1)    Current Meds   1  Daily Multivitamin TABS; Therapy: (Recorded:12Jan2016) to Recorded   2  Diclofenac Sodium 50 MG Oral Tablet Delayed Release; TAKE 1 TABLET PO BID prn; Therapy: 21Ems8581 to (Evaluate:23Mar2018)  Requested for: 26Jun2017; Last   KO:[id number] Ordered   3  Gabapentin 300 MG Oral Capsule; TAKE 1 CAPSULE 3 TIMES DAILY; Therapy: 28FAL5925 to (Evaluate:77Prr6354)  Requested for: 80KPU1015; Last   NV:94CYQ4607 Ordered   4  Simvastatin 20 MG Oral Tablet; TAKE 1 TABLET DAILY; Therapy: 34VGD1411 to (Evaluate:18Apr2017)  Requested for: 24FKB7252; Last   Rx:11Osl2445 Ordered   5   TiZANidine HCl - 2 MG Oral Tablet; TAKE 1 TABLET EVERY 8 HOURS AS NEEDED; Therapy: 55JNX0796 to (Evaluate:15Yxj6650)  Requested for: 51FQM9240; Last   Rx:26Ftp9716 Ordered    Allergies    1   No Known Drug Allergies    Signatures   Electronically signed by : Parmjit Coleman, ; Jun 27 2017  7:33AM EST                       (Author)

## 2018-01-17 NOTE — MISCELLANEOUS
Message   Recorded as Task   Date: 07/21/2017 10:49 AM, Created By: Antonieta Carvajal   Task Name: Miscellaneous   Assigned To: Nemesio Mariscal   Regarding Patient: Pearlene Dakin, Status: In Progress   Comment:    EllaishmaelPascale roque - 21 Jul 2017 10:49 AM     TASK CREATED  Amie Gonzalo from [employer] called requesting clarification of directions for Gabapentin capsules 300mg  Pls call Amie Sánchez at [phone]  Reference # [id number]  Rita Ward - 21 Jul 2017 3:28 PM     TASK IN PROGRESS   Aniyah López - 21 Jul 2017 3:33 PM     TASK EDITED  Spoke with David Anthony, the pharmacist at [address], they wanted to switch from 30 day to 90 days supply, Per Dr Herrera Olivas OK to switch to #270 capsules with 2 refills  Kyara aware  Active Problems    1  Back pain (724 5) (M54 9)   2  DDD (degenerative disc disease), lumbar (722 52) (M51 36)   3  Hyperlipidemia (272 4) (E78 5)   4  Low back pain (724 2) (M54 5)   5  Lumbar radiculopathy (724 4) (M54 16)   6  Lumbar stenosis (724 02) (M48 06)   7  Myofascial pain (729 1) (M79 1)   8  Osteoarthritis of lumbar spine with myelopathy (721 42) (M47 16)   9  Sacroiliitis (720 2) (M46 1)    Current Meds   1  Daily Multivitamin TABS; Therapy: (Recorded:12Jan2016) to Recorded   2  Diclofenac Sodium 50 MG Oral Tablet Delayed Release; TAKE 1 TABLET PO BID prn; Therapy: 07Apr2017 to (Evaluate:23Mar2018)  Requested for: 26Jun2017; Last   UN:[id number] Ordered   3  Gabapentin 300 MG Oral Capsule; TAKE 1 CAPSULE 3 TIMES DAILY; Therapy: 91SOT2465 to (Evaluate:68Zrb6855)  Requested for: 95KFY2001; Last   [id number] Ordered   4  Simvastatin 20 MG Oral Tablet; TAKE 1 TABLET DAILY; Therapy: 33SGI3321 to (Evaluate:18Apr2017)  Requested for: 97XSN4087; Last   Rx:[id number] Ordered   5  TiZANidine HCl - 2 MG Oral Tablet; TAKE 1 TABLET EVERY 8 HOURS AS NEEDED;    Therapy: 07Apr2017 to (Evaluate:75Nug3451)  Requested for: 85OQZ0106; Last   Rx:[id number] Ordered    Allergies    1   No Known Drug Allergies    Signatures   Electronically signed by : Dominique Salcido, ; Jul 21 2017  3:34PM EST                       (Author)

## 2018-01-22 VITALS
BODY MASS INDEX: 26.04 KG/M2 | WEIGHT: 186 LBS | DIASTOLIC BLOOD PRESSURE: 78 MMHG | HEIGHT: 71 IN | TEMPERATURE: 98.1 F | SYSTOLIC BLOOD PRESSURE: 138 MMHG

## 2018-01-22 VITALS
DIASTOLIC BLOOD PRESSURE: 76 MMHG | SYSTOLIC BLOOD PRESSURE: 122 MMHG | HEART RATE: 86 BPM | RESPIRATION RATE: 20 BRPM | TEMPERATURE: 98.7 F

## 2018-01-23 NOTE — MISCELLANEOUS
Message   Recorded as Task   Date: 11/30/2017 09:38 AM, Created By: Jonathan Mix   Task Name: Follow Up   Assigned To: Scott Morgan,Team   Regarding Patient: Ariela Garcia, Status: Active   Comment:    Harriet Corona - 30 Nov 2017 9:38 AM     TASK CREATED  S/P LESI ON 11/21 W/JW  F/U SCHEDULED ON 12/18 W/DG   Harriet Corona - 30 Nov 2017 10:31 AM     TASK EDITED  lmtcb w/cb#  1st attempt   Karoline Cox - 30 Nov 2017 4:03 PM     TASK EDITED  pt returning call and saying he is doing very well  pt has 85 % relief  cb# [phone]  confirmed appt for 12/18/17   Jonathan Mix - 01 Dec 2017 8:05 AM     TASK REASSIGNED: Previously Assigned To [facility] 56  - 01 Dec 2017 1:11 PM     TASK REPLIED TO: Previously Assigned To [address] [place] [address] 34  MD aware        Active Problems    1  Back pain (724 5) (M54 9)   2  DDD (degenerative disc disease), lumbar (722 52) (M51 36)   3  Hyperlipidemia (272 4) (E78 5)   4  Low back pain (724 2) (M54 5)   5  Lumbar radiculopathy (724 4) (M54 16)   6  Lumbar stenosis (724 02) (M48 061)   7  Myofascial pain (729 1) (M79 1)   8  Osteoarthritis of lumbar spine with myelopathy (721 42) (M47 16)   9  Sacroiliitis (720 2) (M46 1)    Current Meds   1  Daily Multivitamin TABS; Therapy: (Recorded:12Jan2016) to Recorded   2  Diclofenac Sodium 50 MG Oral Tablet Delayed Release; TAKE 1 TABLET PO BID prn; Therapy: 07Apr2017 to (Evaluate:23Mar2018)  Requested for: 26Jun2017; Last   PV:[id number] Ordered   3  Gabapentin 400 MG Oral Capsule; TAKE 1 CAPSULE 3 TIMES DAILY; Therapy: 98RIS5080 to (Evaluate:19Jun2018)  Requested for: 36PDE0285; Last   Rx:[id number] Ordered   4  Simvastatin 20 MG Oral Tablet; TAKE 1 TABLET DAILY; Therapy: 80FDB6447 to (Evaluate:18Apr2017)  Requested for: 92YAB6805; Last   Rx:[id number] Ordered   5  TiZANidine HCl - 2 MG Oral Tablet; TAKE 1 TABLET EVERY 8 HOURS AS NEEDED;    Therapy: 07Apr2017 to (Evaluate:91Kbh3129)  Requested for: 11TRU2352; Last   Rx:[id number] Ordered    Allergies    1   No Known Drug Allergies    Signatures   Electronically signed by : Gertrudis Perales, ; Dec  1 2017  1:39PM EST                       (Author)

## 2018-03-13 DIAGNOSIS — M79.18 MYOFASCIAL PAIN: Primary | ICD-10-CM

## 2018-03-14 RX ORDER — TIZANIDINE 2 MG/1
TABLET ORAL
Qty: 180 TABLET | Refills: 0 | OUTPATIENT
Start: 2018-03-14

## 2018-03-23 NOTE — TELEPHONE ENCOUNTER
Pt called to request refill on Tizanidine 4 mg  3x daily  States that he has enough for about 8 more days  Pt uses [facility] delivery service and is requesting a 3 month supply  Pt can be reached at [phone]

## 2018-03-29 RX ORDER — TIZANIDINE HYDROCHLORIDE 4 MG/1
4 CAPSULE, GELATIN COATED ORAL 3 TIMES DAILY
Qty: 90 CAPSULE | Refills: 2 | Status: SHIPPED | OUTPATIENT
Start: 2018-03-29 | End: 2018-04-17 | Stop reason: ALTCHOICE

## 2018-04-09 ENCOUNTER — TRANSCRIBE ORDERS (OUTPATIENT)
Dept: RADIOLOGY | Facility: MEDICAL CENTER | Age: 58
End: 2018-04-09

## 2018-04-09 ENCOUNTER — APPOINTMENT (OUTPATIENT)
Dept: LAB | Facility: MEDICAL CENTER | Age: 58
End: 2018-04-09
Payer: COMMERCIAL

## 2018-04-09 DIAGNOSIS — Z01.818 PREPROCEDURAL EXAMINATION: Primary | ICD-10-CM

## 2018-04-09 DIAGNOSIS — Z01.818 PREPROCEDURAL EXAMINATION: ICD-10-CM

## 2018-04-09 LAB
ALBUMIN SERPL BCP-MCNC: 4.1 G/DL (ref 3.5–5)
ALP SERPL-CCNC: 97 U/L (ref 46–116)
ALT SERPL W P-5'-P-CCNC: 55 U/L (ref 12–78)
ANION GAP SERPL CALCULATED.3IONS-SCNC: 4 MMOL/L (ref 4–13)
AST SERPL W P-5'-P-CCNC: 33 U/L (ref 5–45)
BASOPHILS # BLD AUTO: 0.03 THOUSANDS/ΜL (ref 0–0.1)
BASOPHILS NFR BLD AUTO: 0 % (ref 0–1)
BILIRUB SERPL-MCNC: 1.22 MG/DL (ref 0.2–1)
BUN SERPL-MCNC: 14 MG/DL (ref 5–25)
CALCIUM SERPL-MCNC: 8.4 MG/DL
CHLORIDE SERPL-SCNC: 108 MMOL/L (ref 100–108)
CO2 SERPL-SCNC: 29 MMOL/L (ref 21–32)
CREAT SERPL-MCNC: 1.04 MG/DL (ref 0.6–1.3)
EOSINOPHIL # BLD AUTO: 0.37 THOUSAND/ΜL (ref 0–0.61)
EOSINOPHIL NFR BLD AUTO: 6 % (ref 0–6)
ERYTHROCYTE [DISTWIDTH] IN BLOOD BY AUTOMATED COUNT: 13 % (ref 11.6–15.1)
GFR SERPL CREATININE-BSD FRML MDRD: 78 ML/MIN/1.73SQ M
GLUCOSE SERPL-MCNC: 93 MG/DL (ref 65–140)
HCT VFR BLD AUTO: 45.4 % (ref 36.5–49.3)
HGB BLD-MCNC: 15.5 G/DL (ref 12–17)
LYMPHOCYTES # BLD AUTO: 1.74 THOUSANDS/ΜL (ref 0.6–4.47)
LYMPHOCYTES NFR BLD AUTO: 26 % (ref 14–44)
MCH RBC QN AUTO: 31.6 PG (ref 26.8–34.3)
MCHC RBC AUTO-ENTMCNC: 34.1 G/DL (ref 31.4–37.4)
MCV RBC AUTO: 93 FL (ref 82–98)
MONOCYTES # BLD AUTO: 0.57 THOUSAND/ΜL (ref 0.17–1.22)
MONOCYTES NFR BLD AUTO: 9 % (ref 4–12)
NEUTROPHILS # BLD AUTO: 4.02 THOUSANDS/ΜL (ref 1.85–7.62)
NEUTS SEG NFR BLD AUTO: 59 % (ref 43–75)
NRBC BLD AUTO-RTO: 0 /100 WBCS
PLATELET # BLD AUTO: 206 THOUSANDS/UL (ref 149–390)
PMV BLD AUTO: 10.8 FL (ref 8.9–12.7)
POTASSIUM SERPL-SCNC: 4.7 MMOL/L (ref 3.5–5.3)
PROT SERPL-MCNC: 7.6 G/DL (ref 6.4–8.2)
RBC # BLD AUTO: 4.91 MILLION/UL (ref 3.88–5.62)
SODIUM SERPL-SCNC: 141 MMOL/L (ref 136–145)
WBC # BLD AUTO: 6.74 THOUSAND/UL (ref 4.31–10.16)

## 2018-04-09 PROCEDURE — 80053 COMPREHEN METABOLIC PANEL: CPT

## 2018-04-09 PROCEDURE — 36415 COLL VENOUS BLD VENIPUNCTURE: CPT

## 2018-04-09 PROCEDURE — 85025 COMPLETE CBC W/AUTO DIFF WBC: CPT

## 2018-04-17 ENCOUNTER — OFFICE VISIT (OUTPATIENT)
Dept: FAMILY MEDICINE CLINIC | Facility: CLINIC | Age: 58
End: 2018-04-17
Payer: COMMERCIAL

## 2018-04-17 VITALS
SYSTOLIC BLOOD PRESSURE: 159 MMHG | HEART RATE: 63 BPM | BODY MASS INDEX: 25.34 KG/M2 | OXYGEN SATURATION: 98 % | WEIGHT: 181 LBS | DIASTOLIC BLOOD PRESSURE: 93 MMHG | HEIGHT: 71 IN | TEMPERATURE: 97.9 F

## 2018-04-17 DIAGNOSIS — Z01.818 PRE-OP EVALUATION: Primary | ICD-10-CM

## 2018-04-17 PROBLEM — M46.1 SACROILIITIS (HCC): Status: ACTIVE | Noted: 2017-03-09

## 2018-04-17 PROBLEM — M79.18 MYOFASCIAL PAIN: Status: ACTIVE | Noted: 2017-01-05

## 2018-04-17 PROBLEM — M48.062 LUMBAR STENOSIS WITH NEUROGENIC CLAUDICATION: Status: ACTIVE | Noted: 2018-04-09

## 2018-04-17 PROBLEM — M47.16 OSTEOARTHRITIS OF LUMBAR SPINE WITH MYELOPATHY: Status: ACTIVE | Noted: 2017-01-05

## 2018-04-17 PROBLEM — M48.061 LUMBAR STENOSIS: Status: ACTIVE | Noted: 2017-01-05

## 2018-04-17 PROCEDURE — 99214 OFFICE O/P EST MOD 30 MIN: CPT | Performed by: FAMILY MEDICINE

## 2018-04-17 RX ORDER — ERGOCALCIFEROL (VITAMIN D2) 10 MCG
TABLET ORAL DAILY
COMMUNITY

## 2018-04-17 RX ORDER — SIMVASTATIN 20 MG
TABLET ORAL
COMMUNITY
Start: 2018-03-22 | End: 2018-05-21

## 2018-04-17 RX ORDER — TRAMADOL HYDROCHLORIDE 50 MG/1
50 TABLET ORAL 3 TIMES DAILY
COMMUNITY
End: 2018-05-01 | Stop reason: ALTCHOICE

## 2018-04-17 NOTE — PROGRESS NOTES
Assessment/Plan:    No problem-specific Assessment & Plan notes found for this encounter  Diagnoses and all orders for this visit:    Pre-op evaluation    Other orders  -     Multiple Vitamin (DAILY VALUE MULTIVITAMIN) TABS; Take by mouth  -     simvastatin (ZOCOR) 20 mg tablet;   -     Discontinue: traMADol (ULTRAM) 50 mg tablet; Take 50 mg by mouth 3 (three) times a day      Discussion/Plan:  Patient cleared  F/U as scheduled or sooner if needed  Subjective:      Patient ID: Bucky Fofana is a 59 y o  male  Chief Complaint   Patient presents with    Pre-op Exam     Patient is here today for a pre-op clearance  He is getting back surgery on Thursday 4/19  Patient presents to the office today for pre op clearance  He has PMHx of Lumbar stenosis with neurogenic claudication, DDD of lumbar spine and Hyperlipidemia  He has no complaints/concerns  The following portions of the patient's history were reviewed and updated as appropriate: allergies, current medications, past family history, past medical history, past social history, past surgical history and problem list    Patient Active Problem List   Diagnosis    Lumbar radiculopathy    Chronic bilateral low back pain    DDD (degenerative disc disease), lumbar    Hypercholesterolemia    Lumbar stenosis    Neurogenic claudication due to lumbar spinal stenosis    Myofascial pain    Osteoarthritis of lumbar spine with myelopathy    Sacroiliitis ([facility] [address] )    Acute gout of hand       Current Outpatient Prescriptions on File Prior to Visit   Medication Sig Dispense Refill    gabapentin (NEURONTIN) 300 mg capsule Take 300 mg by mouth 3 (three) times a day       No current facility-administered medications on file prior to visit  Review of Systems   Constitutional: Negative  HENT: Negative  Eyes: Negative  Respiratory: Negative  Gastrointestinal: Negative  Endocrine: Negative  Genitourinary: Negative      Skin: Negative  Allergic/Immunologic: Negative  Neurological: Negative  Hematological: Negative  Psychiatric/Behavioral: Negative  Objective:      /93 (BP Location: Left arm, Patient Position: Sitting, Cuff Size: Large)   Pulse 63   Temp 97 9 °F (36 6 °C) (Tympanic)   Ht 5' 11" (1 803 m)   Wt 82 1 kg (181 lb)   SpO2 98%   BMI 25 24 kg/m²     BP recheck - 150/90 mmHg     Physical Exam   Constitutional: He is oriented to person, place, and time  He appears well-developed and well-nourished  HENT:   Head: Normocephalic and atraumatic  Right Ear: External ear normal    Left Ear: External ear normal    Nose: Nose normal    Mouth/Throat: Oropharynx is clear and moist    Eyes: Conjunctivae and EOM are normal  Pupils are equal, round, and reactive to light  Neck: Normal range of motion  Neck supple  Cardiovascular: Normal rate, regular rhythm and intact distal pulses  Pulmonary/Chest: Effort normal and breath sounds normal    Abdominal: Soft  Bowel sounds are normal    Neurological: He is alert and oriented to person, place, and time  He has normal reflexes  Skin: Skin is warm and dry  Psychiatric: He has a normal mood and affect   His behavior is normal

## 2018-04-18 ENCOUNTER — TRANSCRIBE ORDERS (OUTPATIENT)
Dept: ADMINISTRATIVE | Facility: HOSPITAL | Age: 58
End: 2018-04-18

## 2018-04-18 ENCOUNTER — HOSPITAL ENCOUNTER (OUTPATIENT)
Dept: NON INVASIVE DIAGNOSTICS | Facility: HOSPITAL | Age: 58
Discharge: HOME | End: 2018-04-18
Payer: COMMERCIAL

## 2018-04-18 DIAGNOSIS — Z01.818 PRE-OP TESTING: Primary | ICD-10-CM

## 2018-04-18 DIAGNOSIS — Z01.818 PRE-OP TESTING: ICD-10-CM

## 2018-04-18 LAB
ATRIAL RATE: 82 BPM
P AXIS: 74 DEGREES
PR INTERVAL: 166 MS
QRS AXIS: 40 DEGREES
QRSD INTERVAL: 106 MS
QT INTERVAL: 364 MS
QTC INTERVAL: 425 MS
T WAVE AXIS: 69 DEGREES
VENTRICULAR RATE: 82 BPM

## 2018-04-18 PROCEDURE — 93010 ELECTROCARDIOGRAM REPORT: CPT | Performed by: INTERNAL MEDICINE

## 2018-04-18 PROCEDURE — 93005 ELECTROCARDIOGRAM TRACING: CPT

## 2018-05-01 ENCOUNTER — OFFICE VISIT (OUTPATIENT)
Dept: FAMILY MEDICINE CLINIC | Facility: CLINIC | Age: 58
End: 2018-05-01
Payer: COMMERCIAL

## 2018-05-01 VITALS
DIASTOLIC BLOOD PRESSURE: 78 MMHG | HEIGHT: 71 IN | WEIGHT: 181 LBS | OXYGEN SATURATION: 98 % | RESPIRATION RATE: 18 BRPM | BODY MASS INDEX: 25.34 KG/M2 | SYSTOLIC BLOOD PRESSURE: 142 MMHG | HEART RATE: 84 BPM | TEMPERATURE: 98.9 F

## 2018-05-01 DIAGNOSIS — M10.00 ACUTE IDIOPATHIC GOUT, UNSPECIFIED SITE: Primary | ICD-10-CM

## 2018-05-01 PROBLEM — M10.9 ACUTE GOUT OF HAND: Status: ACTIVE | Noted: 2018-04-19

## 2018-05-01 PROCEDURE — 99213 OFFICE O/P EST LOW 20 MIN: CPT | Performed by: FAMILY MEDICINE

## 2018-05-01 RX ORDER — INDOMETHACIN 50 MG/1
50 CAPSULE ORAL 2 TIMES DAILY WITH MEALS
Qty: 30 CAPSULE | Refills: 0 | Status: SHIPPED | OUTPATIENT
Start: 2018-05-01 | End: 2018-05-21 | Stop reason: SDUPTHER

## 2018-05-01 RX ORDER — PROBENECID AND COLCHICINE 500; .5 MG/1; MG/1
1 TABLET ORAL 2 TIMES DAILY
Qty: 20 TABLET | Refills: 1 | Status: SHIPPED | OUTPATIENT
Start: 2018-05-01 | End: 2018-05-01 | Stop reason: SDUPTHER

## 2018-05-01 RX ORDER — PROBENECID AND COLCHICINE 500; .5 MG/1; MG/1
1 TABLET ORAL 2 TIMES DAILY
Qty: 20 TABLET | Refills: 0 | Status: SHIPPED | OUTPATIENT
Start: 2018-05-01 | End: 2018-05-21 | Stop reason: SDUPTHER

## 2018-05-01 RX ORDER — INDOMETHACIN 50 MG/1
50 CAPSULE ORAL 2 TIMES DAILY WITH MEALS
Qty: 30 CAPSULE | Refills: 1 | Status: SHIPPED | OUTPATIENT
Start: 2018-05-01 | End: 2018-05-01 | Stop reason: SDUPTHER

## 2018-05-01 NOTE — PROGRESS NOTES
History and Physical  Breezy Medellin 59 y o  male MRN: [id number]      Assessment:   Gout    Plan:  Indomethacin for pain  Colchicine Probenecid BID x 5 days only  RTC if no improvement  Chief Complaint   Patient presents with    Gout Pain     right two fingers pointer and middle         HPI:  Breezy Medellin is a 59 y o  male who presents with pain and swelling to R hand  Started about 2 weeks ago  Was taken off all meds before recent back surgery and that's when it started  Fingers are red, warm and very swollen  Historical Information   Past Medical History:   Diagnosis Date    Back pain     DDD (degenerative disc disease), lumbar     Hyperlipidemia      Past Surgical History:   Procedure Laterality Date    EPIDURAL BLOCK INJECTION N/A 11/21/2017    Procedure: LUMBAR EPIDURAL STEROID INJECTION;  Surgeon: Guicho Fowler DO;  [facility];  Service: Pain Management     HAND SURGERY Right     thumb    OR [address] 82 DX/THER SBST EPIDURAL/SUBARACH LUMBAR/SACRAL N/A 2/7/2017    Procedure: LUMBAR EPIDURAL STEROID INJECTION;  Surgeon: Guicho Fowler DO;  [facility];  Service: Pain Management      Social History   History   Alcohol Use    Yes     Comment: 2-3 beers daily     History   Drug Use No     History   Smoking Status    Never Smoker   Smokeless Tobacco    Not on file     No family history on file  Meds/Allergies   No Known Allergies    Meds:    Current Outpatient Prescriptions:     gabapentin (NEURONTIN) 300 mg capsule, Take 300 mg by mouth 3 (three) times a day, Disp: , Rfl:     Multiple Vitamin (DAILY VALUE MULTIVITAMIN) TABS, Take by mouth, Disp: , Rfl:     simvastatin (ZOCOR) 20 mg tablet, , Disp: , Rfl:       REVIEW OF SYSTEMS  Review of Systems   Constitutional: Negative  HENT: Negative  Eyes: Negative  Respiratory: Negative  Cardiovascular: Negative  Gastrointestinal: Negative  Endocrine: Negative  Genitourinary: Negative      Musculoskeletal:        As per HPI    Skin: Negative  Allergic/Immunologic: Negative  Neurological: Negative  Hematological: Negative  Psychiatric/Behavioral: Negative  Current Vitals:   Blood Pressure: 142/78 (05/01/18 1011)  Pulse: 84 (05/01/18 1011)  Temperature: 98 9 °F (37 2 °C) (05/01/18 1011)  Respirations: 18 (05/01/18 1011)  Height: 5' 11" (180 3 cm) (05/01/18 1011)  Weight - Scale: 82 1 kg (181 lb) (05/01/18 1011)  SpO2: 98 % (05/01/18 1011)      PHYSICAL EXAMS:  Physical Exam   Constitutional: He is oriented to person, place, and time  He appears well-developed and well-nourished  HENT:   Head: Normocephalic and atraumatic  Right Ear: External ear normal    Left Ear: External ear normal    Musculoskeletal:   2nd and 3rd digits of R hand are warm to the touch and swollen most prominent over MIP joints  Neurological: He is alert and oriented to person, place, and time  Psychiatric: He has a normal mood and affect  Lab Results:          Allen Chu PA-C  5/1/2018, 10:17 AM

## 2018-05-21 DIAGNOSIS — M10.00 ACUTE IDIOPATHIC GOUT, UNSPECIFIED SITE: ICD-10-CM

## 2018-05-21 RX ORDER — INDOMETHACIN 50 MG/1
50 CAPSULE ORAL 2 TIMES DAILY WITH MEALS
Qty: 30 CAPSULE | Refills: 0 | Status: SHIPPED | OUTPATIENT
Start: 2018-05-21 | End: 2018-08-16 | Stop reason: SDUPTHER

## 2018-05-21 RX ORDER — PROBENECID AND COLCHICINE 500; .5 MG/1; MG/1
1 TABLET ORAL 2 TIMES DAILY
Qty: 20 TABLET | Refills: 0 | Status: SHIPPED | OUTPATIENT
Start: 2018-05-21 | End: 2018-08-16 | Stop reason: SDUPTHER

## 2018-05-21 RX ORDER — METHOCARBAMOL 500 MG/1
TABLET, FILM COATED ORAL
COMMUNITY
Start: 2018-05-04 | End: 2018-09-04

## 2018-08-16 DIAGNOSIS — M10.00 ACUTE IDIOPATHIC GOUT, UNSPECIFIED SITE: Primary | ICD-10-CM

## 2018-08-16 RX ORDER — PROBENECID AND COLCHICINE 500; .5 MG/1; MG/1
1 TABLET ORAL 2 TIMES DAILY
Qty: 20 TABLET | Refills: 0 | Status: SHIPPED | OUTPATIENT
Start: 2018-08-16 | End: 2018-09-04

## 2018-08-16 RX ORDER — INDOMETHACIN 50 MG/1
50 CAPSULE ORAL 2 TIMES DAILY WITH MEALS
Qty: 30 CAPSULE | Refills: 0 | Status: SHIPPED | OUTPATIENT
Start: 2018-08-16 | End: 2018-08-27 | Stop reason: SDUPTHER

## 2018-08-27 DIAGNOSIS — M10.00 ACUTE IDIOPATHIC GOUT, UNSPECIFIED SITE: ICD-10-CM

## 2018-08-27 RX ORDER — INDOMETHACIN 50 MG/1
50 CAPSULE ORAL 2 TIMES DAILY WITH MEALS
Qty: 30 CAPSULE | Refills: 0 | Status: SHIPPED | OUTPATIENT
Start: 2018-08-27 | End: 2018-10-15

## 2018-09-04 ENCOUNTER — OFFICE VISIT (OUTPATIENT)
Dept: FAMILY MEDICINE CLINIC | Facility: CLINIC | Age: 58
End: 2018-09-04
Payer: COMMERCIAL

## 2018-09-04 VITALS
DIASTOLIC BLOOD PRESSURE: 70 MMHG | HEIGHT: 71 IN | WEIGHT: 170 LBS | HEART RATE: 80 BPM | SYSTOLIC BLOOD PRESSURE: 140 MMHG | OXYGEN SATURATION: 97 % | BODY MASS INDEX: 23.8 KG/M2 | TEMPERATURE: 98 F | RESPIRATION RATE: 18 BRPM

## 2018-09-04 DIAGNOSIS — Z12.5 SCREENING FOR PROSTATE CANCER: ICD-10-CM

## 2018-09-04 DIAGNOSIS — M1A.9XX0 CHRONIC GOUT WITHOUT TOPHUS, UNSPECIFIED CAUSE, UNSPECIFIED SITE: Primary | ICD-10-CM

## 2018-09-04 DIAGNOSIS — E78.5 DYSLIPIDEMIA: ICD-10-CM

## 2018-09-04 DIAGNOSIS — Z00.00 PHYSICAL EXAM, ROUTINE: ICD-10-CM

## 2018-09-04 PROCEDURE — 99214 OFFICE O/P EST MOD 30 MIN: CPT | Performed by: FAMILY MEDICINE

## 2018-09-04 RX ORDER — ATORVASTATIN CALCIUM 10 MG/1
10 TABLET, FILM COATED ORAL
Qty: 30 TABLET | Refills: 5 | Status: SHIPPED | OUTPATIENT
Start: 2018-09-04 | End: 2018-09-04 | Stop reason: SDUPTHER

## 2018-09-04 RX ORDER — ALLOPURINOL 100 MG/1
100 TABLET ORAL DAILY
Qty: 30 TABLET | Refills: 1 | Status: SHIPPED | OUTPATIENT
Start: 2018-09-04 | End: 2018-10-15 | Stop reason: SDUPTHER

## 2018-09-04 RX ORDER — ATORVASTATIN CALCIUM 10 MG/1
10 TABLET, FILM COATED ORAL
Qty: 30 TABLET | Refills: 1 | Status: SHIPPED | OUTPATIENT
Start: 2018-09-04 | End: 2018-10-15

## 2018-09-04 RX ORDER — ATORVASTATIN CALCIUM 10 MG/1
10 TABLET, FILM COATED ORAL
Qty: 30 TABLET | Refills: 1 | Status: SHIPPED | OUTPATIENT
Start: 2018-09-04 | End: 2018-09-04 | Stop reason: SDUPTHER

## 2018-09-04 RX ORDER — ALLOPURINOL 100 MG/1
100 TABLET ORAL DAILY
Qty: 30 TABLET | Refills: 1 | Status: SHIPPED | OUTPATIENT
Start: 2018-09-04 | End: 2018-09-04 | Stop reason: CLARIF

## 2018-09-04 NOTE — PROGRESS NOTES
History and Physical  Leann Andrade 59 y o  male MRN: [id number]      Assessment:   Gout  Dyslipidemia  Erectile dysfunction  Well physical    Plan:  Start Allopurinol 100 mg daily  Change to Lipitor 10 mg daily from Simvastatin  Check CBC,CMP, LIPIDS, PSA and TSH with T4  Will further discuss ED issue after testing results reviewed  RTC 6 weeks    Chief Complaint   Patient presents with    Physical Exam     Patient is here for insurance wellness and also to talk about his gout   HPI:  Leann Andrade is a 59 y o  male who presents for above  He is doing well except for gout attacks which are becoming more often  He also c/o erectile dysfunction that has been happening   He reports he is Simvastatin but it is not in our record  He admits he gets leg cramps since starting that ar night only  Historical Information   Past Medical History:   Diagnosis Date    Back pain     DDD (degenerative disc disease), lumbar     Hyperlipidemia      Past Surgical History:   Procedure Laterality Date    EPIDURAL BLOCK INJECTION N/A 11/21/2017    Procedure: LUMBAR EPIDURAL STEROID INJECTION;  Surgeon: Glenny Hampton DO;  [facility];  Service: Pain Management     HAND SURGERY Right     thumb    [place] [address] 82 DX/THER SBST EPIDURAL/SUBARACH LUMBAR/SACRAL N/A 2/7/2017    Procedure: LUMBAR EPIDURAL STEROID INJECTION;  Surgeon: Glenny Hampton DO;  [facility];  Service: Pain Management      Social History   History   Alcohol Use    Yes     Comment: 2-3 beers daily     History   Drug Use No     History   Smoking Status    Never Smoker   Smokeless Tobacco    Not on file     No family history on file      Meds/Allergies   Allergies   Allergen Reactions    No Active Allergies     Other        Meds:    Current Outpatient Prescriptions:     indomethacin (INDOCIN) 50 mg capsule, Take 1 capsule (50 mg total) by mouth 2 (two) times a day with meals, Disp: 30 capsule, Rfl: 0    Multiple Vitamin (DAILY VALUE MULTIVITAMIN) TABS, Take by mouth, Disp: , Rfl:       REVIEW OF SYSTEMS  Review of Systems   Constitutional: Negative  HENT: Negative  Eyes: Negative  Respiratory: Negative  Cardiovascular: Negative  Gastrointestinal: Negative  Endocrine: Negative  Genitourinary: Negative  Musculoskeletal:        As per HPI   Skin: Negative  Allergic/Immunologic: Negative  Neurological: Negative  Hematological: Negative  Psychiatric/Behavioral: Negative  Current Vitals:   Blood Pressure: 140/70 (09/04/18 1535)  Pulse: 80 (09/04/18 1535)  Temperature: 98 °F (36 7 °C) (09/04/18 1535)  Respirations: 18 (09/04/18 1535)  Height: 5' 11" (180 3 cm) (09/04/18 1535)  Weight - Scale: 77 1 kg (170 lb) (09/04/18 1535)  SpO2: 97 % (09/04/18 1535)  Body mass index is 23 71 kg/m²  PHYSICAL EXAMS:  Physical Exam   Constitutional: He is oriented to person, place, and time  He appears well-developed and well-nourished  HENT:   Head: Normocephalic and atraumatic  Right Ear: External ear normal    Left Ear: External ear normal    Mouth/Throat: Oropharynx is clear and moist    Eyes: EOM are normal  Pupils are equal, round, and reactive to light  Neck: Normal range of motion  Neck supple  No thyromegaly present  Cardiovascular: Normal rate, regular rhythm, normal heart sounds and intact distal pulses  Pulmonary/Chest: Effort normal and breath sounds normal  He has no wheezes  He has no rales  Abdominal: Soft  Bowel sounds are normal  He exhibits no mass  There is no tenderness  Musculoskeletal: Normal range of motion  He exhibits no edema  Neurological: He is alert and oriented to person, place, and time  No cranial nerve deficit  Skin: Skin is warm and dry  Psychiatric: He has a normal mood and affect  Lab Results:          Vesna Dunham PA-C  9/4/2018, 3:40 PM

## 2018-09-08 ENCOUNTER — APPOINTMENT (OUTPATIENT)
Dept: LAB | Facility: HOSPITAL | Age: 58
End: 2018-09-08
Payer: COMMERCIAL

## 2018-09-08 DIAGNOSIS — Z12.5 SCREENING FOR PROSTATE CANCER: ICD-10-CM

## 2018-09-08 DIAGNOSIS — E78.5 DYSLIPIDEMIA: ICD-10-CM

## 2018-09-08 DIAGNOSIS — M1A.9XX0 CHRONIC GOUT WITHOUT TOPHUS, UNSPECIFIED CAUSE, UNSPECIFIED SITE: ICD-10-CM

## 2018-09-08 LAB
ALBUMIN SERPL BCP-MCNC: 2.2 G/DL (ref 3.5–5)
ALP SERPL-CCNC: 102 U/L (ref 46–116)
ALT SERPL W P-5'-P-CCNC: 38 U/L (ref 12–78)
ANION GAP SERPL CALCULATED.3IONS-SCNC: 9 MMOL/L (ref 4–13)
AST SERPL W P-5'-P-CCNC: 24 U/L (ref 5–45)
BASOPHILS # BLD AUTO: 0.06 THOUSANDS/ΜL (ref 0–0.1)
BASOPHILS NFR BLD AUTO: 1 % (ref 0–1)
BILIRUB SERPL-MCNC: 1.4 MG/DL (ref 0.2–1)
BUN SERPL-MCNC: 13 MG/DL (ref 5–25)
CALCIUM SERPL-MCNC: 9 MG/DL (ref 8.3–10.1)
CHLORIDE SERPL-SCNC: 104 MMOL/L (ref 100–108)
CHOLEST SERPL-MCNC: 210 MG/DL (ref 50–200)
CO2 SERPL-SCNC: 29 MMOL/L (ref 21–32)
CREAT SERPL-MCNC: 0.83 MG/DL (ref 0.6–1.3)
EOSINOPHIL # BLD AUTO: 0.41 THOUSAND/ΜL (ref 0–0.61)
EOSINOPHIL NFR BLD AUTO: 8 % (ref 0–6)
ERYTHROCYTE [DISTWIDTH] IN BLOOD BY AUTOMATED COUNT: 13.8 % (ref 11.6–15.1)
GFR SERPL CREATININE-BSD FRML MDRD: 96 ML/MIN/1.73SQ M
GLUCOSE P FAST SERPL-MCNC: 101 MG/DL (ref 65–99)
HCT VFR BLD AUTO: 48.7 % (ref 36.5–49.3)
HDLC SERPL-MCNC: 64 MG/DL (ref 40–60)
HGB BLD-MCNC: 16.2 G/DL (ref 12–17)
IMM GRANULOCYTES # BLD AUTO: 0.01 THOUSAND/UL (ref 0–0.2)
IMM GRANULOCYTES NFR BLD AUTO: 0 % (ref 0–2)
LDLC SERPL CALC-MCNC: 120 MG/DL (ref 0–100)
LYMPHOCYTES # BLD AUTO: 1.87 THOUSANDS/ΜL (ref 0.6–4.47)
LYMPHOCYTES NFR BLD AUTO: 35 % (ref 14–44)
MCH RBC QN AUTO: 29.8 PG (ref 26.8–34.3)
MCHC RBC AUTO-ENTMCNC: 33.3 G/DL (ref 31.4–37.4)
MCV RBC AUTO: 90 FL (ref 82–98)
MONOCYTES # BLD AUTO: 0.57 THOUSAND/ΜL (ref 0.17–1.22)
MONOCYTES NFR BLD AUTO: 11 % (ref 4–12)
NEUTROPHILS # BLD AUTO: 2.36 THOUSANDS/ΜL (ref 1.85–7.62)
NEUTS SEG NFR BLD AUTO: 45 % (ref 43–75)
NONHDLC SERPL-MCNC: 146 MG/DL
NRBC BLD AUTO-RTO: 0 /100 WBCS
PLATELET # BLD AUTO: 214 THOUSANDS/UL (ref 149–390)
PMV BLD AUTO: 9.6 FL (ref 8.9–12.7)
POTASSIUM SERPL-SCNC: 4.9 MMOL/L (ref 3.5–5.3)
PROT SERPL-MCNC: 7.5 G/DL (ref 6.4–8.2)
PSA SERPL-MCNC: 1 NG/ML (ref 0–4)
RBC # BLD AUTO: 5.43 MILLION/UL (ref 3.88–5.62)
SODIUM SERPL-SCNC: 142 MMOL/L (ref 136–145)
TRIGL SERPL-MCNC: 128 MG/DL
TSH SERPL DL<=0.05 MIU/L-ACNC: 1.15 UIU/ML (ref 0.36–3.74)
WBC # BLD AUTO: 5.28 THOUSAND/UL (ref 4.31–10.16)

## 2018-09-08 PROCEDURE — 85025 COMPLETE CBC W/AUTO DIFF WBC: CPT

## 2018-09-08 PROCEDURE — 80061 LIPID PANEL: CPT

## 2018-09-08 PROCEDURE — 36415 COLL VENOUS BLD VENIPUNCTURE: CPT

## 2018-09-08 PROCEDURE — 80053 COMPREHEN METABOLIC PANEL: CPT

## 2018-09-08 PROCEDURE — 84443 ASSAY THYROID STIM HORMONE: CPT

## 2018-09-08 PROCEDURE — G0103 PSA SCREENING: HCPCS

## 2018-10-15 ENCOUNTER — OFFICE VISIT (OUTPATIENT)
Dept: FAMILY MEDICINE CLINIC | Facility: CLINIC | Age: 58
End: 2018-10-15
Payer: COMMERCIAL

## 2018-10-15 VITALS
DIASTOLIC BLOOD PRESSURE: 100 MMHG | OXYGEN SATURATION: 97 % | WEIGHT: 178 LBS | RESPIRATION RATE: 14 BRPM | SYSTOLIC BLOOD PRESSURE: 160 MMHG | TEMPERATURE: 97.6 F | HEART RATE: 66 BPM | HEIGHT: 71 IN | BODY MASS INDEX: 24.92 KG/M2

## 2018-10-15 DIAGNOSIS — E78.5 DYSLIPIDEMIA: ICD-10-CM

## 2018-10-15 DIAGNOSIS — M1A.9XX0 CHRONIC GOUT WITHOUT TOPHUS, UNSPECIFIED CAUSE, UNSPECIFIED SITE: ICD-10-CM

## 2018-10-15 DIAGNOSIS — R03.0 ELEVATED BLOOD PRESSURE READING IN OFFICE WITHOUT DIAGNOSIS OF HYPERTENSION: Primary | ICD-10-CM

## 2018-10-15 PROCEDURE — 99213 OFFICE O/P EST LOW 20 MIN: CPT | Performed by: FAMILY MEDICINE

## 2018-10-15 RX ORDER — ATORVASTATIN CALCIUM 40 MG/1
10 TABLET, FILM COATED ORAL DAILY
COMMUNITY
End: 2019-02-19

## 2018-10-15 RX ORDER — ALLOPURINOL 300 MG/1
300 TABLET ORAL DAILY
Qty: 30 TABLET | Refills: 3 | Status: SHIPPED | OUTPATIENT
Start: 2018-10-15 | End: 2019-02-19

## 2018-10-15 RX ORDER — ALLOPURINOL 300 MG/1
300 TABLET ORAL DAILY
Qty: 30 TABLET | Refills: 5 | Status: SHIPPED | OUTPATIENT
Start: 2018-10-15 | End: 2018-10-15 | Stop reason: SDUPTHER

## 2018-10-15 NOTE — PROGRESS NOTES
History and Physical  Patricia Norris 59 y o  male MRN: [id number]      Assessment:   Gout  Elevated b/p without dx of HTN    Plan:  Increase ALLOPURINOL to 300 mh daily  List of low purine foods was given to him  Decrease sodium in diet  RTC 2-3 days for b/p check      Chief Complaint   Patient presents with    Gout Pain     left pain        HPI:  Patricia Norris is a 59 y o  male who presents with gout issues  2 weeks ago had gout flair but much improved now  Is interested in increasing Allopurinol dose for prevention  Recent labs reviewed with him  Historical Information   Past Medical History:   Diagnosis Date    Back pain     DDD (degenerative disc disease), lumbar     Hyperlipidemia      Past Surgical History:   Procedure Laterality Date    EPIDURAL BLOCK INJECTION N/A 11/21/2017    Procedure: LUMBAR EPIDURAL STEROID INJECTION;  Surgeon: Elba Lowe DO;  [facility];  Service: Pain Management     HAND SURGERY Right     thumb    [place] [address] 82 DX/THER SBST EPIDURAL/SUBARACH LUMBAR/SACRAL N/A 2/7/2017    Procedure: LUMBAR EPIDURAL STEROID INJECTION;  Surgeon: Elba Lowe DO;  [facility];  Service: Pain Management      Social History   History   Alcohol Use    Yes     Comment: 2-3 beers daily     History   Drug Use No     History   Smoking Status    Never Smoker   Smokeless Tobacco    Not on file     No family history on file      Meds/Allergies   Allergies   Allergen Reactions    No Active Allergies     No Known Allergies     Other        Meds:    Current Outpatient Prescriptions:     allopurinol (ZYLOPRIM) 100 mg tablet, Take 1 tablet (100 mg total) by mouth daily, Disp: 30 tablet, Rfl: 1    atorvastatin (LIPITOR) 40 mg tablet, Take 10 mg by mouth daily , Disp: , Rfl:     Multiple Vitamin (DAILY VALUE MULTIVITAMIN) TABS, Take by mouth, Disp: , Rfl:       REVIEW OF SYSTEMS  Review of Systems   Musculoskeletal:        As per HPI   All other systems reviewed and are negative  Current Vitals:   Blood Pressure: 160/100 (10/15/18 1614)  Pulse: 66 (10/15/18 1614)  Temperature: 97 6 °F (36 4 °C) (10/15/18 1614)  Temp Source: Tympanic (10/15/18 1614)  Respirations: 14 (10/15/18 1614)  Height: 5' 11" (180 3 cm) (10/15/18 1614)  Weight - Scale: 80 7 kg (178 lb) (10/15/18 1614)  SpO2: 97 % (10/15/18 1614)  Body mass index is 24 83 kg/m²  PHYSICAL EXAMS:  Physical Exam   Constitutional: He is oriented to person, place, and time  He appears well-developed and well-nourished  HENT:   Head: Normocephalic and atraumatic  Right Ear: External ear normal    Left Ear: External ear normal    Eyes: Pupils are equal, round, and reactive to light  Neck: Normal range of motion  Neck supple  No thyromegaly present  Cardiovascular: Normal rate and regular rhythm  Pulmonary/Chest: Effort normal and breath sounds normal  He has no wheezes  He has no rales  Musculoskeletal:   Mild tenderness over L achilles tendon  No erythema or tenderness  Neurological: He is alert and oriented to person, place, and time  Psychiatric: He has a normal mood and affect  Lab Results:          Emelia Colunga PA-C  10/15/2018, 4:25 PM

## 2018-10-18 ENCOUNTER — CLINICAL SUPPORT (OUTPATIENT)
Dept: FAMILY MEDICINE CLINIC | Facility: CLINIC | Age: 58
End: 2018-10-18

## 2018-10-18 VITALS — DIASTOLIC BLOOD PRESSURE: 102 MMHG | SYSTOLIC BLOOD PRESSURE: 150 MMHG

## 2018-10-18 DIAGNOSIS — Z01.30 BP CHECK: Primary | ICD-10-CM

## 2018-10-18 DIAGNOSIS — I10 ESSENTIAL HYPERTENSION: Primary | ICD-10-CM

## 2018-10-18 RX ORDER — AMLODIPINE BESYLATE 5 MG/1
5 TABLET ORAL DAILY
Qty: 30 TABLET | Refills: 3 | Status: SHIPPED | OUTPATIENT
Start: 2018-10-18 | End: 2018-10-19 | Stop reason: SDUPTHER

## 2018-10-19 DIAGNOSIS — I10 ESSENTIAL HYPERTENSION: Primary | ICD-10-CM

## 2018-10-19 RX ORDER — AMLODIPINE BESYLATE 5 MG/1
5 TABLET ORAL DAILY
Qty: 30 TABLET | Refills: 5 | Status: SHIPPED | OUTPATIENT
Start: 2018-10-19 | End: 2018-10-23 | Stop reason: SDUPTHER

## 2018-10-23 ENCOUNTER — OFFICE VISIT (OUTPATIENT)
Dept: FAMILY MEDICINE CLINIC | Facility: CLINIC | Age: 58
End: 2018-10-23
Payer: COMMERCIAL

## 2018-10-23 VITALS
BODY MASS INDEX: 25.06 KG/M2 | TEMPERATURE: 97.6 F | WEIGHT: 179 LBS | OXYGEN SATURATION: 97 % | RESPIRATION RATE: 18 BRPM | DIASTOLIC BLOOD PRESSURE: 100 MMHG | HEART RATE: 78 BPM | SYSTOLIC BLOOD PRESSURE: 146 MMHG | HEIGHT: 71 IN

## 2018-10-23 DIAGNOSIS — I10 ESSENTIAL HYPERTENSION: Primary | ICD-10-CM

## 2018-10-23 PROCEDURE — 99213 OFFICE O/P EST LOW 20 MIN: CPT | Performed by: FAMILY MEDICINE

## 2018-10-23 RX ORDER — AMLODIPINE BESYLATE 10 MG/1
10 TABLET ORAL DAILY
Qty: 30 TABLET | Refills: 1 | Status: SHIPPED | OUTPATIENT
Start: 2018-10-23 | End: 2018-12-03 | Stop reason: SDUPTHER

## 2018-10-23 RX ORDER — AMLODIPINE BESYLATE 5 MG/1
10 TABLET ORAL DAILY
Qty: 30 TABLET | Refills: 1 | Status: SHIPPED | OUTPATIENT
Start: 2018-10-23 | End: 2018-10-23 | Stop reason: CLARIF

## 2018-10-23 NOTE — PROGRESS NOTES
History and Physical  Iliana Hess 59 y o  male MRN: [id number]      Assessment:   HTN    Plan:  Increase Norvasc to 10 mg daily  RTC for b/p check in 1 week    Chief Complaint   Patient presents with    Hypertension        HPI:  Iliana Hess is a 59 y o  male who presents for HTN follow up  He was started on Norvasc 5 mg al last visit with no significant change  Denies cp/press or palpitations  No headaches or vision changes  Historical Information   Past Medical History:   Diagnosis Date    Back pain     DDD (degenerative disc disease), lumbar     Hyperlipidemia      Past Surgical History:   Procedure Laterality Date    EPIDURAL BLOCK INJECTION N/A 11/21/2017    Procedure: LUMBAR EPIDURAL STEROID INJECTION;  Surgeon: Елена Tracey DO;  [facility];  Service: Pain Management     HAND SURGERY Right     thumb    [place] [address] 82 DX/THER SBST EPIDURAL/SUBARACH LUMBAR/SACRAL N/A 2/7/2017    Procedure: LUMBAR EPIDURAL STEROID INJECTION;  Surgeon: Елена Tracey DO;  [facility];  Service: Pain Management      Social History   History   Alcohol Use    Yes     Comment: 2-3 beers daily     History   Drug Use No     History   Smoking Status    Never Smoker   Smokeless Tobacco    Not on file     History reviewed  No pertinent family history  Meds/Allergies   Allergies   Allergen Reactions    No Active Allergies     No Known Allergies     Other        Meds:    Current Outpatient Prescriptions:     allopurinol (ZYLOPRIM) 300 mg tablet, Take 1 tablet (300 mg total) by mouth daily, Disp: 30 tablet, Rfl: 3    amLODIPine (NORVASC) 5 mg tablet, Take 1 tablet (5 mg total) by mouth daily, Disp: 30 tablet, Rfl: 5    atorvastatin (LIPITOR) 40 mg tablet, Take 10 mg by mouth daily , Disp: , Rfl:     Multiple Vitamin (DAILY VALUE MULTIVITAMIN) TABS, Take by mouth, Disp: , Rfl:       REVIEW OF SYSTEMS  Review of Systems   Constitutional: Negative  HENT: Negative  Eyes: Negative  Respiratory: Negative  Cardiovascular:        As per HPI   Psychiatric/Behavioral: Negative  Current Vitals:   Blood Pressure: 146/100 (10/23/18 1631)  Pulse: 78 (10/23/18 1631)  Temperature: 97 6 °F (36 4 °C) (10/23/18 1631)  Respirations: 18 (10/23/18 1631)  Height: 5' 11" (180 3 cm) (10/23/18 1631)  Weight - Scale: 81 2 kg (179 lb) (10/23/18 1631)  SpO2: 97 % (10/23/18 1631)  Body mass index is 24 97 kg/m²  PHYSICAL EXAMS:  Physical Exam   Constitutional: He is oriented to person, place, and time  He appears well-developed and well-nourished  HENT:   Head: Normocephalic and atraumatic  Right Ear: External ear normal    Left Ear: External ear normal    Eyes: Pupils are equal, round, and reactive to light  Neck: Normal range of motion  Neck supple  No thyromegaly present  Cardiovascular: Normal rate, regular rhythm and normal heart sounds  Pulmonary/Chest: Effort normal and breath sounds normal  He has no wheezes  He has no rales  Musculoskeletal: He exhibits no edema  Neurological: He is alert and oriented to person, place, and time  Psychiatric: He has a normal mood and affect  Lab Results:          Nuris Gay PA-C  10/23/2018, 4:36 PM

## 2018-10-30 ENCOUNTER — CLINICAL SUPPORT (OUTPATIENT)
Dept: FAMILY MEDICINE CLINIC | Facility: CLINIC | Age: 58
End: 2018-10-30

## 2018-10-30 VITALS — SYSTOLIC BLOOD PRESSURE: 140 MMHG | DIASTOLIC BLOOD PRESSURE: 82 MMHG

## 2018-10-30 DIAGNOSIS — I10 HYPERTENSION, UNSPECIFIED TYPE: Primary | ICD-10-CM

## 2018-12-03 ENCOUNTER — OFFICE VISIT (OUTPATIENT)
Dept: FAMILY MEDICINE CLINIC | Facility: CLINIC | Age: 58
End: 2018-12-03
Payer: COMMERCIAL

## 2018-12-03 VITALS
BODY MASS INDEX: 25.34 KG/M2 | HEIGHT: 71 IN | SYSTOLIC BLOOD PRESSURE: 128 MMHG | RESPIRATION RATE: 16 BRPM | WEIGHT: 181 LBS | HEART RATE: 81 BPM | OXYGEN SATURATION: 97 % | TEMPERATURE: 97.2 F | DIASTOLIC BLOOD PRESSURE: 80 MMHG

## 2018-12-03 DIAGNOSIS — I10 ESSENTIAL HYPERTENSION: Primary | ICD-10-CM

## 2018-12-03 PROCEDURE — 99213 OFFICE O/P EST LOW 20 MIN: CPT | Performed by: FAMILY MEDICINE

## 2018-12-03 RX ORDER — LISINOPRIL 5 MG/1
5 TABLET ORAL DAILY
Qty: 30 TABLET | Refills: 1 | Status: SHIPPED | OUTPATIENT
Start: 2018-12-03 | End: 2019-01-07 | Stop reason: SDUPTHER

## 2018-12-03 RX ORDER — AMLODIPINE BESYLATE 10 MG/1
10 TABLET ORAL DAILY
Qty: 90 TABLET | Refills: 1 | Status: SHIPPED | OUTPATIENT
Start: 2018-12-03 | End: 2019-04-18 | Stop reason: SDUPTHER

## 2018-12-03 NOTE — PROGRESS NOTES
History and Physical  Ramon Lucia 59 y o  male MRN: [id number]      Assessment:   HTN  ED  Dyslipdemia    Plan: Will add Lisinopril 5 mg daily  Repeat b/p 140/ 90  Will address ED issue after b/p gets under better control  RTC 10 days for b/p with MA  RTC 1 month    No chief complaint on file  HPI:  Ramon Lucia is a 59 y o  male who presents for HTN follow up  He is doing well and offers no complaints  Wife has expressed concerns over ED issues  Historical Information   Past Medical History:   Diagnosis Date    Back pain     DDD (degenerative disc disease), lumbar     Hyperlipidemia      Past Surgical History:   Procedure Laterality Date    EPIDURAL BLOCK INJECTION N/A 11/21/2017    Procedure: LUMBAR EPIDURAL STEROID INJECTION;  Surgeon: Vahe Phillips DO;  [facility];  Service: Pain Management     HAND SURGERY Right     thumb    [place] David [address] 84 DX/THER SBST EPIDURAL/SUBARACH LUMBAR/SACRAL N/A 2/7/2017    Procedure: LUMBAR EPIDURAL STEROID INJECTION;  Surgeon: Vahe Phillips DO;  [facility];  Service: Pain Management      Social History   History   Alcohol Use    Yes     Comment: 2-3 beers daily     History   Drug Use No     History   Smoking Status    Never Smoker   Smokeless Tobacco    Not on file     No family history on file  Meds/Allergies   No Active Allergies    Meds:    Current Outpatient Prescriptions:     allopurinol (ZYLOPRIM) 300 mg tablet, Take 1 tablet (300 mg total) by mouth daily, Disp: 30 tablet, Rfl: 3    amLODIPine (NORVASC) 10 mg tablet, Take 1 tablet (10 mg total) by mouth daily, Disp: 30 tablet, Rfl: 1    atorvastatin (LIPITOR) 40 mg tablet, Take 10 mg by mouth daily , Disp: , Rfl:     Multiple Vitamin (DAILY VALUE MULTIVITAMIN) TABS, Take by mouth, Disp: , Rfl:       REVIEW OF SYSTEMS  Review of Systems   Constitutional: Negative  HENT: Negative  Eyes: Negative  Cardiovascular: Negative      Genitourinary:        ED issues   Psychiatric/Behavioral: Negative  Current Vitals:   Blood Pressure: 128/80 (12/03/18 1628)  Pulse: 81 (12/03/18 1628)  Temperature: (!) 97 2 °F (36 2 °C) (12/03/18 1628)  Temp Source: Tympanic (12/03/18 1628)  Respirations: 16 (12/03/18 1628)  Height: 5' 11" (180 3 cm) (12/03/18 1628)  Weight - Scale: 82 1 kg (181 lb) (12/03/18 1628)  SpO2: 97 % (12/03/18 1628)  Body mass index is 25 24 kg/m²  PHYSICAL EXAMS:  Physical Exam   Constitutional: He is oriented to person, place, and time  He appears well-developed and well-nourished  HENT:   Head: Normocephalic and atraumatic  Right Ear: External ear normal    Left Ear: External ear normal    Eyes: Pupils are equal, round, and reactive to light  Neck: Normal range of motion  Neck supple  No thyromegaly present  Cardiovascular: Normal rate and regular rhythm  Murmur heard  Pulmonary/Chest: Effort normal and breath sounds normal  He has no wheezes  He has no rales  Neurological: He is alert and oriented to person, place, and time  No cranial nerve deficit  Psychiatric: He has a normal mood and affect  Lab Results:          Jake Peralta PA-C  12/3/2018, 4:35 PM

## 2018-12-12 ENCOUNTER — CLINICAL SUPPORT (OUTPATIENT)
Dept: FAMILY MEDICINE CLINIC | Facility: CLINIC | Age: 58
End: 2018-12-12

## 2018-12-12 VITALS — SYSTOLIC BLOOD PRESSURE: 118 MMHG | DIASTOLIC BLOOD PRESSURE: 72 MMHG

## 2018-12-12 DIAGNOSIS — I10 HYPERTENSION, UNSPECIFIED TYPE: Primary | ICD-10-CM

## 2019-01-07 ENCOUNTER — OFFICE VISIT (OUTPATIENT)
Dept: FAMILY MEDICINE CLINIC | Facility: CLINIC | Age: 59
End: 2019-01-07
Payer: COMMERCIAL

## 2019-01-07 VITALS
OXYGEN SATURATION: 98 % | DIASTOLIC BLOOD PRESSURE: 78 MMHG | HEIGHT: 71 IN | WEIGHT: 186 LBS | HEART RATE: 68 BPM | RESPIRATION RATE: 16 BRPM | BODY MASS INDEX: 26.04 KG/M2 | SYSTOLIC BLOOD PRESSURE: 132 MMHG | TEMPERATURE: 97.2 F

## 2019-01-07 DIAGNOSIS — I10 ESSENTIAL HYPERTENSION: ICD-10-CM

## 2019-01-07 PROCEDURE — 99213 OFFICE O/P EST LOW 20 MIN: CPT | Performed by: FAMILY MEDICINE

## 2019-01-07 RX ORDER — LISINOPRIL 5 MG/1
5 TABLET ORAL DAILY
Qty: 90 TABLET | Refills: 1 | Status: SHIPPED | OUTPATIENT
Start: 2019-01-07 | End: 2019-08-05 | Stop reason: SDUPTHER

## 2019-01-07 NOTE — PROGRESS NOTES
History and Physical  Obdulia Viera 59 y o  male MRN: [id number]      Assessment:   HTN    Plan:  Continue current meds  RTC 6 weeks and will discuss ED issue after I'm sure b/p is well controlled    Chief Complaint   Patient presents with    Follow-up        HPI:  Obdulia Viera is a 59 y o  male who presents for HTN follow up  He is doing well  B/P is much better controlled  No other complaints today  Historical Information   Past Medical History:   Diagnosis Date    Back pain     DDD (degenerative disc disease), lumbar     Hyperlipidemia      Past Surgical History:   Procedure Laterality Date    EPIDURAL BLOCK INJECTION N/A 11/21/2017    Procedure: LUMBAR EPIDURAL STEROID INJECTION;  Surgeon: Rosemary Portillo DO;  Location: [facility];  Service: Pain Management     HAND SURGERY Right     thumb    [place] David Junior Grey 82 DX/THER SBST EPIDURAL/SUBARACH LUMBAR/SACRAL N/A 2/7/2017    Procedure: LUMBAR EPIDURAL STEROID INJECTION;  Surgeon: Rosemary Portillo DO;  Location: [facility];  Service: Pain Management      Social History   History   Alcohol Use    Yes     Comment: 2-3 beers daily     History   Drug Use No     History   Smoking Status    Never Smoker   Smokeless Tobacco    Not on file     No family history on file      Meds/Allergies   No Known Allergies    Meds:    Current Outpatient Prescriptions:     allopurinol (ZYLOPRIM) 300 mg tablet, Take 1 tablet (300 mg total) by mouth daily, Disp: 30 tablet, Rfl: 3    amLODIPine (NORVASC) 10 mg tablet, Take 1 tablet (10 mg total) by mouth daily, Disp: 90 tablet, Rfl: 1    atorvastatin (LIPITOR) 40 mg tablet, Take 10 mg by mouth daily , Disp: , Rfl:     lisinopril (ZESTRIL) 5 mg tablet, Take 1 tablet (5 mg total) by mouth daily, Disp: 30 tablet, Rfl: 1    Multiple Vitamin (DAILY VALUE MULTIVITAMIN) TABS, Take by mouth, Disp: , Rfl:     Specialty Vitamins Products (HAIR NOURISHING SUPPLEMENT) TABS, , Disp: , Rfl:       REVIEW OF SYSTEMS  Review of Systems   Constitutional: Negative  HENT: Negative  Eyes: Negative  Respiratory: Negative  Cardiovascular: Negative  Gastrointestinal: Negative  Endocrine: Negative  Genitourinary: Negative  Musculoskeletal: Negative  Skin: Negative  Allergic/Immunologic: Negative  Neurological: Negative  Hematological: Negative  Psychiatric/Behavioral: Negative  Current Vitals:   Blood Pressure: 132/78 (01/07/19 1634)  Pulse: 68 (01/07/19 1634)  Temperature: (!) 97 2 °F (36 2 °C) (01/07/19 1634)  Temp Source: Tympanic (01/07/19 1634)  Respirations: 16 (01/07/19 1634)  Height: 5' 11" (180 3 cm) (01/07/19 1634)  Weight - Scale: 84 4 kg (186 lb) (01/07/19 1634)  SpO2: 98 % (01/07/19 1634)  Body mass index is 25 94 kg/m²  PHYSICAL EXAMS:  Physical Exam   Constitutional: He is oriented to person, place, and time  He appears well-developed and well-nourished  HENT:   Head: Normocephalic and atraumatic  Right Ear: External ear normal    Left Ear: External ear normal    Eyes: Pupils are equal, round, and reactive to light  Neck: Normal range of motion  Neck supple  No thyromegaly present  Cardiovascular: Normal rate and regular rhythm  Pulmonary/Chest: Effort normal and breath sounds normal  He has no wheezes  He has no rales  Musculoskeletal: He exhibits no edema  Neurological: He is alert and oriented to person, place, and time  No cranial nerve deficit  Skin: Skin is warm and dry  Psychiatric: He has a normal mood and affect  Lab Results:          Ce Marquis PA-C  1/7/2019, 4:47 PM

## 2019-02-19 ENCOUNTER — OFFICE VISIT (OUTPATIENT)
Dept: FAMILY MEDICINE CLINIC | Facility: CLINIC | Age: 59
End: 2019-02-19
Payer: COMMERCIAL

## 2019-02-19 VITALS
DIASTOLIC BLOOD PRESSURE: 76 MMHG | WEIGHT: 187 LBS | RESPIRATION RATE: 18 BRPM | HEIGHT: 71 IN | SYSTOLIC BLOOD PRESSURE: 130 MMHG | HEART RATE: 72 BPM | BODY MASS INDEX: 26.18 KG/M2 | TEMPERATURE: 97.6 F | OXYGEN SATURATION: 97 %

## 2019-02-19 DIAGNOSIS — N52.9 ERECTILE DYSFUNCTION, UNSPECIFIED ERECTILE DYSFUNCTION TYPE: Primary | ICD-10-CM

## 2019-02-19 DIAGNOSIS — I10 ESSENTIAL HYPERTENSION: ICD-10-CM

## 2019-02-19 DIAGNOSIS — R25.2 LEG CRAMPING: ICD-10-CM

## 2019-02-19 PROCEDURE — 99213 OFFICE O/P EST LOW 20 MIN: CPT | Performed by: FAMILY MEDICINE

## 2019-02-19 RX ORDER — SILDENAFIL 25 MG/1
25 TABLET, FILM COATED ORAL DAILY PRN
Qty: 10 TABLET | Refills: 1 | Status: SHIPPED | OUTPATIENT
Start: 2019-02-19 | End: 2019-02-28 | Stop reason: CLARIF

## 2019-02-19 NOTE — PROGRESS NOTES
History and Physical  Obdulia Viera 59 y o  male MRN: [id number]      Assessment:   HTN  ED    Plan:  Continue current meds   Add Viagra 25 mg prn for ED  Increase dietary foods with potassium ie" bananas  He is not interested in magnesium therapy for the leg cramping at this time  RTC 4 months        Chief Complaint   Patient presents with    Hypertension        HPI:  Obdulia Viera is a 59 y o  male who presents for HTN follow up  He is doing well  He admits to leg cramping at night only  He is interested in something for ED  Historical Information   Past Medical History:   Diagnosis Date    Back pain     DDD (degenerative disc disease), lumbar     Hyperlipidemia      Past Surgical History:   Procedure Laterality Date    EPIDURAL BLOCK INJECTION N/A 11/21/2017    Procedure: LUMBAR EPIDURAL STEROID INJECTION;  Surgeon: Rosemary Portillo DO;  Location: [facility];  Service: Pain Management     HAND SURGERY Right     thumb    [place] David Junior Grey 82 DX/THER SBST EPIDURAL/SUBARACH LUMBAR/SACRAL N/A 2/7/2017    Procedure: LUMBAR EPIDURAL STEROID INJECTION;  Surgeon: Rosemary Portillo DO;  Location: [facility];  Service: Pain Management      Social History   Social History     Substance and Sexual Activity   Alcohol Use Yes    Comment: 2-3 beers daily     Social History     Substance and Sexual Activity   Drug Use No     Social History     Tobacco Use   Smoking Status Never Smoker     History reviewed  No pertinent family history      Meds/Allergies   No Known Allergies    Meds:    Current Outpatient Medications:     amLODIPine (NORVASC) 10 mg tablet, Take 1 tablet (10 mg total) by mouth daily, Disp: 90 tablet, Rfl: 1    lisinopril (ZESTRIL) 5 mg tablet, Take 1 tablet (5 mg total) by mouth daily, Disp: 90 tablet, Rfl: 1    Multiple Vitamin (DAILY VALUE MULTIVITAMIN) TABS, Take by mouth, Disp: , Rfl:     Specialty Vitamins Products (HAIR NOURISHING SUPPLEMENT) TABS, , Disp: , Rfl:       REVIEW OF SYSTEMS  Review of Systems Constitutional: Negative  HENT: Negative  Eyes: Negative  Respiratory: Negative  Cardiovascular: Negative  Gastrointestinal: Negative  Endocrine: Negative  Genitourinary: Negative  Musculoskeletal:        Leg cramping   Skin: Negative  Allergic/Immunologic: Negative  Neurological: Negative  Hematological: Negative  Psychiatric/Behavioral: Negative  Current Vitals:   Blood Pressure: 130/76 (02/19/19 1616)  Pulse: 72 (02/19/19 1616)  Temperature: 97 6 °F (36 4 °C) (02/19/19 1616)  Respirations: 18 (02/19/19 1616)  Height: 5' 11" (180 3 cm) (02/19/19 1616)  Weight - Scale: 84 8 kg (187 lb) (02/19/19 1616)  SpO2: 97 % (02/19/19 1616)  Body mass index is 26 08 kg/m²  PHYSICAL EXAMS:  Physical Exam   Constitutional: He is oriented to person, place, and time  He appears well-developed and well-nourished  HENT:   Head: Normocephalic and atraumatic  Right Ear: External ear normal    Left Ear: External ear normal    Eyes: Pupils are equal, round, and reactive to light  Neck: Normal range of motion  Neck supple  No thyromegaly present  Cardiovascular: Normal rate, regular rhythm and normal heart sounds  Pulmonary/Chest: Effort normal and breath sounds normal  He has no wheezes  He has no rales  Musculoskeletal: He exhibits no edema  Neurological: He is alert and oriented to person, place, and time  No cranial nerve deficit  Skin: Skin is warm and dry  Psychiatric: He has a normal mood and affect  Nursing note and vitals reviewed  Lab Results:          Perri Medina PA-C  2/19/2019, 4:27 PM

## 2019-02-28 ENCOUNTER — TELEPHONE (OUTPATIENT)
Dept: FAMILY MEDICINE CLINIC | Facility: CLINIC | Age: 59
End: 2019-02-28

## 2019-02-28 DIAGNOSIS — N52.9 ERECTILE DYSFUNCTION, UNSPECIFIED ERECTILE DYSFUNCTION TYPE: Primary | ICD-10-CM

## 2019-02-28 RX ORDER — TADALAFIL 5 MG/1
5 TABLET ORAL DAILY PRN
Qty: 10 TABLET | Refills: 0 | Status: SHIPPED | OUTPATIENT
Start: 2019-02-28 | End: 2019-03-19 | Stop reason: SDUPTHER

## 2019-02-28 NOTE — TELEPHONE ENCOUNTER
I ordered Cialis  I'm not sure if that is covered  He may need to check with insurance company  They may not cover any of those meds

## 2019-03-19 DIAGNOSIS — N52.9 ERECTILE DYSFUNCTION, UNSPECIFIED ERECTILE DYSFUNCTION TYPE: ICD-10-CM

## 2019-03-19 RX ORDER — TADALAFIL 5 MG/1
5 TABLET ORAL DAILY PRN
Qty: 30 TABLET | Refills: 1 | Status: SHIPPED | OUTPATIENT
Start: 2019-03-19 | End: 2019-03-21

## 2019-03-21 RX ORDER — SILDENAFIL 25 MG/1
25 TABLET, FILM COATED ORAL 2 TIMES DAILY PRN
COMMUNITY
End: 2019-12-30

## 2019-04-18 DIAGNOSIS — I10 ESSENTIAL HYPERTENSION: ICD-10-CM

## 2019-04-18 RX ORDER — AMLODIPINE BESYLATE 10 MG/1
10 TABLET ORAL DAILY
Qty: 90 TABLET | Refills: 1 | Status: SHIPPED | OUTPATIENT
Start: 2019-04-18 | End: 2019-04-22 | Stop reason: SDUPTHER

## 2019-04-22 DIAGNOSIS — I10 ESSENTIAL HYPERTENSION: ICD-10-CM

## 2019-04-23 RX ORDER — AMLODIPINE BESYLATE 10 MG/1
10 TABLET ORAL DAILY
Qty: 90 TABLET | Refills: 1 | Status: SHIPPED | OUTPATIENT
Start: 2019-04-23 | End: 2019-05-06 | Stop reason: SDUPTHER

## 2019-05-06 DIAGNOSIS — I10 ESSENTIAL HYPERTENSION: ICD-10-CM

## 2019-05-06 RX ORDER — AMLODIPINE BESYLATE 10 MG/1
10 TABLET ORAL DAILY
Qty: 90 TABLET | Refills: 1 | Status: SHIPPED | OUTPATIENT
Start: 2019-05-06 | End: 2019-12-18 | Stop reason: SDUPTHER

## 2019-08-05 DIAGNOSIS — I10 ESSENTIAL HYPERTENSION: ICD-10-CM

## 2019-08-06 RX ORDER — LISINOPRIL 5 MG/1
TABLET ORAL
Qty: 90 TABLET | Refills: 1 | Status: SHIPPED | OUTPATIENT
Start: 2019-08-06 | End: 2020-01-10

## 2019-11-05 DIAGNOSIS — I10 ESSENTIAL HYPERTENSION: ICD-10-CM

## 2019-11-05 RX ORDER — AMLODIPINE BESYLATE 10 MG/1
TABLET ORAL
Qty: 90 TABLET | Refills: 4 | OUTPATIENT
Start: 2019-11-05

## 2019-11-14 DIAGNOSIS — Z11.4 SCREENING FOR HIV (HUMAN IMMUNODEFICIENCY VIRUS): ICD-10-CM

## 2019-11-14 DIAGNOSIS — Z12.5 SCREENING FOR PROSTATE CANCER: Primary | ICD-10-CM

## 2019-11-14 DIAGNOSIS — Z11.59 ENCOUNTER FOR HEPATITIS C SCREENING TEST FOR LOW RISK PATIENT: ICD-10-CM

## 2019-11-14 DIAGNOSIS — I10 ESSENTIAL HYPERTENSION: ICD-10-CM

## 2019-12-05 DIAGNOSIS — I10 ESSENTIAL HYPERTENSION: ICD-10-CM

## 2019-12-05 RX ORDER — AMLODIPINE BESYLATE 10 MG/1
TABLET ORAL
Qty: 90 TABLET | Refills: 4 | OUTPATIENT
Start: 2019-12-05

## 2019-12-07 ENCOUNTER — APPOINTMENT (OUTPATIENT)
Dept: LAB | Facility: HOSPITAL | Age: 59
End: 2019-12-07
Payer: COMMERCIAL

## 2019-12-07 DIAGNOSIS — Z11.59 ENCOUNTER FOR HEPATITIS C SCREENING TEST FOR LOW RISK PATIENT: ICD-10-CM

## 2019-12-07 DIAGNOSIS — Z12.5 SCREENING FOR PROSTATE CANCER: ICD-10-CM

## 2019-12-07 DIAGNOSIS — I10 ESSENTIAL HYPERTENSION: ICD-10-CM

## 2019-12-07 DIAGNOSIS — Z11.4 SCREENING FOR HIV (HUMAN IMMUNODEFICIENCY VIRUS): ICD-10-CM

## 2019-12-07 LAB
ALBUMIN SERPL BCP-MCNC: 4 G/DL (ref 3.5–5)
ALP SERPL-CCNC: 82 U/L (ref 46–116)
ALT SERPL W P-5'-P-CCNC: 34 U/L (ref 12–78)
ANION GAP SERPL CALCULATED.3IONS-SCNC: 9 MMOL/L (ref 4–13)
AST SERPL W P-5'-P-CCNC: 17 U/L (ref 5–45)
BASOPHILS # BLD AUTO: 0.07 THOUSANDS/ΜL (ref 0–0.1)
BASOPHILS NFR BLD AUTO: 1 % (ref 0–1)
BILIRUB SERPL-MCNC: 1 MG/DL (ref 0.2–1)
BUN SERPL-MCNC: 14 MG/DL (ref 5–25)
CALCIUM SERPL-MCNC: 8.8 MG/DL (ref 8.3–10.1)
CHLORIDE SERPL-SCNC: 105 MMOL/L (ref 100–108)
CHOLEST SERPL-MCNC: 257 MG/DL (ref 50–200)
CO2 SERPL-SCNC: 29 MMOL/L (ref 21–32)
CREAT SERPL-MCNC: 0.86 MG/DL (ref 0.6–1.3)
CREAT UR-MCNC: 196 MG/DL
EOSINOPHIL # BLD AUTO: 0.53 THOUSAND/ΜL (ref 0–0.61)
EOSINOPHIL NFR BLD AUTO: 10 % (ref 0–6)
ERYTHROCYTE [DISTWIDTH] IN BLOOD BY AUTOMATED COUNT: 12.7 % (ref 11.6–15.1)
GFR SERPL CREATININE-BSD FRML MDRD: 94 ML/MIN/1.73SQ M
GLUCOSE P FAST SERPL-MCNC: 101 MG/DL (ref 65–99)
HCT VFR BLD AUTO: 48.9 % (ref 36.5–49.3)
HDLC SERPL-MCNC: 58 MG/DL
HGB BLD-MCNC: 16.2 G/DL (ref 12–17)
IMM GRANULOCYTES # BLD AUTO: 0.01 THOUSAND/UL (ref 0–0.2)
IMM GRANULOCYTES NFR BLD AUTO: 0 % (ref 0–2)
LDLC SERPL CALC-MCNC: 169 MG/DL (ref 0–100)
LYMPHOCYTES # BLD AUTO: 1.9 THOUSANDS/ΜL (ref 0.6–4.47)
LYMPHOCYTES NFR BLD AUTO: 35 % (ref 14–44)
MCH RBC QN AUTO: 31.5 PG (ref 26.8–34.3)
MCHC RBC AUTO-ENTMCNC: 33.1 G/DL (ref 31.4–37.4)
MCV RBC AUTO: 95 FL (ref 82–98)
MICROALBUMIN UR-MCNC: 8.4 MG/L (ref 0–20)
MICROALBUMIN/CREAT 24H UR: 4 MG/G CREATININE (ref 0–30)
MONOCYTES # BLD AUTO: 0.64 THOUSAND/ΜL (ref 0.17–1.22)
MONOCYTES NFR BLD AUTO: 12 % (ref 4–12)
NEUTROPHILS # BLD AUTO: 2.24 THOUSANDS/ΜL (ref 1.85–7.62)
NEUTS SEG NFR BLD AUTO: 42 % (ref 43–75)
NONHDLC SERPL-MCNC: 199 MG/DL
NRBC BLD AUTO-RTO: 0 /100 WBCS
PLATELET # BLD AUTO: 239 THOUSANDS/UL (ref 149–390)
PMV BLD AUTO: 9.7 FL (ref 8.9–12.7)
POTASSIUM SERPL-SCNC: 4.6 MMOL/L (ref 3.5–5.3)
PROT SERPL-MCNC: 7.4 G/DL (ref 6.4–8.2)
RBC # BLD AUTO: 5.15 MILLION/UL (ref 3.88–5.62)
SODIUM SERPL-SCNC: 143 MMOL/L (ref 136–145)
TRIGL SERPL-MCNC: 152 MG/DL
TSH SERPL DL<=0.05 MIU/L-ACNC: 1.35 UIU/ML (ref 0.36–3.74)
WBC # BLD AUTO: 5.39 THOUSAND/UL (ref 4.31–10.16)

## 2019-12-07 PROCEDURE — 87389 HIV-1 AG W/HIV-1&-2 AB AG IA: CPT

## 2019-12-07 PROCEDURE — 82570 ASSAY OF URINE CREATININE: CPT | Performed by: PHYSICIAN ASSISTANT

## 2019-12-07 PROCEDURE — 36415 COLL VENOUS BLD VENIPUNCTURE: CPT

## 2019-12-07 PROCEDURE — 87521 HEPATITIS C PROBE&RVRS TRNSC: CPT

## 2019-12-07 PROCEDURE — 80053 COMPREHEN METABOLIC PANEL: CPT

## 2019-12-07 PROCEDURE — 85025 COMPLETE CBC W/AUTO DIFF WBC: CPT

## 2019-12-07 PROCEDURE — 80061 LIPID PANEL: CPT

## 2019-12-07 PROCEDURE — 84443 ASSAY THYROID STIM HORMONE: CPT

## 2019-12-07 PROCEDURE — 82043 UR ALBUMIN QUANTITATIVE: CPT | Performed by: PHYSICIAN ASSISTANT

## 2019-12-07 PROCEDURE — 84154 ASSAY OF PSA FREE: CPT

## 2019-12-07 PROCEDURE — 84153 ASSAY OF PSA TOTAL: CPT

## 2019-12-09 LAB — HIV 1+2 AB+HIV1 P24 AG SERPL QL IA: NORMAL

## 2019-12-10 ENCOUNTER — TELEPHONE (OUTPATIENT)
Dept: FAMILY MEDICINE CLINIC | Facility: HOME HEALTHCARE | Age: 59
End: 2019-12-10

## 2019-12-10 LAB
HCV RNA SERPL QL NAA+PROBE: NEGATIVE
PSA FREE MFR SERPL: 30 %
PSA FREE SERPL-MCNC: 0.33 NG/ML
PSA SERPL-MCNC: 1.1 NG/ML (ref 0–4)

## 2019-12-10 NOTE — TELEPHONE ENCOUNTER
Patients wife aMe Pressley said he will discuss this at his visit with you on Tuesday    ----- Message from Priscila Diaz PA-C sent at 12/9/2019  1:27 PM EST -----  Please call the patient regarding his abnormal result  Lipids elevated  Needs meds or dietary changes

## 2019-12-18 DIAGNOSIS — I10 ESSENTIAL HYPERTENSION: ICD-10-CM

## 2019-12-18 RX ORDER — AMLODIPINE BESYLATE 10 MG/1
10 TABLET ORAL DAILY
Qty: 30 TABLET | Refills: 0 | Status: SHIPPED | OUTPATIENT
Start: 2019-12-18 | End: 2019-12-30 | Stop reason: SDUPTHER

## 2019-12-30 ENCOUNTER — OFFICE VISIT (OUTPATIENT)
Dept: FAMILY MEDICINE CLINIC | Facility: CLINIC | Age: 59
End: 2019-12-30
Payer: COMMERCIAL

## 2019-12-30 VITALS
SYSTOLIC BLOOD PRESSURE: 134 MMHG | OXYGEN SATURATION: 99 % | TEMPERATURE: 98.5 F | BODY MASS INDEX: 25.9 KG/M2 | DIASTOLIC BLOOD PRESSURE: 84 MMHG | WEIGHT: 185 LBS | RESPIRATION RATE: 18 BRPM | HEART RATE: 78 BPM | HEIGHT: 71 IN

## 2019-12-30 DIAGNOSIS — E78.2 MIXED HYPERLIPIDEMIA: Primary | ICD-10-CM

## 2019-12-30 DIAGNOSIS — I10 ESSENTIAL HYPERTENSION: ICD-10-CM

## 2019-12-30 PROCEDURE — 99213 OFFICE O/P EST LOW 20 MIN: CPT | Performed by: FAMILY MEDICINE

## 2019-12-30 RX ORDER — CEPHALEXIN 500 MG/1
CAPSULE ORAL
Refills: 0 | COMMUNITY
Start: 2019-12-27 | End: 2020-11-18

## 2019-12-30 RX ORDER — AMLODIPINE BESYLATE 10 MG/1
10 TABLET ORAL DAILY
Qty: 90 TABLET | Refills: 1 | Status: SHIPPED | OUTPATIENT
Start: 2019-12-30 | End: 2020-06-29

## 2019-12-30 NOTE — PROGRESS NOTES
Assessment/Plan:     Diagnoses and all orders for this visit:    Mixed hyperlipidemia  -     Lipid panel; Future    Essential hypertension  -     amLODIPine (NORVASC) 10 mg tablet; Take 1 tablet (10 mg total) by mouth daily    Other orders  -     cephalexin (KEFLEX) 500 mg capsule; TAKE CAPSULE BY MOUTH TWICE DAILY FOR 10 DAYS        He wishes to try diet and exercise before starting any lipid lowering agents  Continue B/P meds  Lipid pnael 1-2 weeks before next appt  RTC 6 months          Subjective:        Patient ID: Iman Da Silva is a 62 y o  male  Chief Complaint   Patient presents with    Well Check     no complaints today       61 yo male presents for HTN follow up  He is doing well  Recent labs reviewed with him  Had basal cell Ca removed from R shoulder 2 weeks ago  He offers no complaints today        The following portions of the patient's history were reviewed and updated as appropriate: allergies, current medications, past family history, past medical history, past social history, past surgical history and problem list     Patient Active Problem List   Diagnosis    Lumbar radiculopathy    Chronic bilateral low back pain    DDD (degenerative disc disease), lumbar    Hypercholesterolemia    Spinal stenosis of lumbar region    Neurogenic claudication due to lumbar spinal stenosis    Myofascial pain    Osteoarthritis of lumbar spine with myelopathy    Sacroiliitis (HCC)    Acute gout of hand       Current Outpatient Medications   Medication Sig Dispense Refill    amLODIPine (NORVASC) 10 mg tablet Take 1 tablet (10 mg total) by mouth daily 90 tablet 1    cephalexin (KEFLEX) 500 mg capsule TAKE CAPSULE BY MOUTH TWICE DAILY FOR 10 DAYS  0    lisinopril (ZESTRIL) 5 mg tablet TAKE 1 TABLET DAILY 90 tablet 1    Multiple Vitamin (DAILY VALUE MULTIVITAMIN) TABS Take by mouth      Specialty Vitamins Products (HAIR NOURISHING SUPPLEMENT) TABS        No current facility-administered medications for this visit           Past Medical History:   Diagnosis Date    Back pain     DDD (degenerative disc disease), lumbar     Hyperlipidemia         Past Surgical History:   Procedure Laterality Date    EPIDURAL BLOCK INJECTION N/A 11/21/2017    Procedure: LUMBAR EPIDURAL STEROID INJECTION;  Surgeon: Stephany Conrad DO;  [facility]: [facility];  Service: Pain Management     HAND SURGERY Right     thumb    [place] David Junior Grey 82 DX/THER SBST EPIDURAL/SUBARACH LUMBAR/SACRAL N/A 2/7/2017    Procedure: LUMBAR EPIDURAL STEROID INJECTION;  Surgeon: Stephany Conrad DO;  Location: [facility];  Service: Pain Management         Social History     Socioeconomic History    Marital status: /Civil Union     Spouse name: Not on file    Number of children: Not on file    Years of education: Not on file    Highest education level: Not on file   Occupational History    Not on file   Social Needs    Financial resource strain: Not on file    Food insecurity:     Worry: Not on file     Inability: Not on file    Transportation needs:     Medical: Not on file     Non-medical: Not on file   Tobacco Use    Smoking status: Never Smoker   Substance and Sexual Activity    Alcohol use: Yes     Comment: 2-3 beers daily    Drug use: No    Sexual activity: Not on file   Lifestyle    Physical activity:     Days per week: Not on file     Minutes per session: Not on file    Stress: Not on file   Relationships    Social connections:     Talks on phone: Not on file     Gets together: Not on file     Attends Yazidism service: Not on file     Active member of club or organization: Not on file     Attends meetings of clubs or organizations: Not on file     Relationship status: Not on file    Intimate partner violence:     Fear of current or ex partner: Not on file     Emotionally abused: Not on file     Physically abused: Not on file     Forced sexual activity: Not on file   Other Topics Concern    Not on file   Social History Narrative    Not on file        Review of Systems   Constitutional: Negative  HENT: Negative  Eyes: Negative  Respiratory: Negative  Cardiovascular: Negative  Gastrointestinal: Negative  Endocrine: Negative  Genitourinary: Negative  Musculoskeletal: Negative  Skin: Negative  Allergic/Immunologic: Negative  Neurological: Negative  Hematological: Negative  Psychiatric/Behavioral: Negative  Objective:      /84   Pulse 78   Temp 98 5 °F (36 9 °C)   Resp 18   Ht 5' 11" (1 803 m)   Wt 83 9 kg (185 lb)   SpO2 99%   BMI 25 80 kg/m²          Physical Exam   Constitutional: He is oriented to person, place, and time  He appears well-developed and well-nourished  HENT:   Head: Normocephalic and atraumatic  Right Ear: External ear normal    Left Ear: External ear normal    Eyes: Pupils are equal, round, and reactive to light  Conjunctivae and EOM are normal    Neck: Normal range of motion  Neck supple  No thyromegaly present  Cardiovascular: Normal rate, regular rhythm and normal heart sounds  Pulmonary/Chest: Effort normal and breath sounds normal  He has no wheezes  He has no rales  Abdominal: Soft  Bowel sounds are normal  There is no tenderness  Small umbilical hernia noted   Musculoskeletal: Normal range of motion  He exhibits no edema  Lymphadenopathy:     He has no cervical adenopathy  Neurological: He is alert and oriented to person, place, and time  No cranial nerve deficit  Skin: Skin is warm and dry  Psychiatric: He has a normal mood and affect  Nursing note and vitals reviewed

## 2020-01-10 DIAGNOSIS — I10 ESSENTIAL HYPERTENSION: ICD-10-CM

## 2020-01-10 RX ORDER — LISINOPRIL 5 MG/1
TABLET ORAL
Qty: 90 TABLET | Refills: 1 | Status: SHIPPED | OUTPATIENT
Start: 2020-01-10 | End: 2020-07-09

## 2020-06-24 ENCOUNTER — APPOINTMENT (RX ONLY)
Dept: URBAN - NONMETROPOLITAN AREA CLINIC 4 | Facility: CLINIC | Age: 60
Setting detail: DERMATOLOGY
End: 2020-06-24

## 2020-06-24 DIAGNOSIS — Z85.828 PERSONAL HISTORY OF OTHER MALIGNANT NEOPLASM OF SKIN: ICD-10-CM

## 2020-06-24 DIAGNOSIS — L57.0 ACTINIC KERATOSIS: ICD-10-CM

## 2020-06-24 PROBLEM — D48.5 NEOPLASM OF UNCERTAIN BEHAVIOR OF SKIN: Status: ACTIVE | Noted: 2020-06-24

## 2020-06-24 PROCEDURE — 11302 SHAVE SKIN LESION 1.1-2.0 CM: CPT | Mod: 59

## 2020-06-24 PROCEDURE — ? LIQUID NITROGEN

## 2020-06-24 PROCEDURE — ? SHAVE REMOVAL

## 2020-06-24 PROCEDURE — 99213 OFFICE O/P EST LOW 20 MIN: CPT | Mod: 25

## 2020-06-24 PROCEDURE — ? COUNSELING

## 2020-06-24 PROCEDURE — 17004 DESTROY PREMAL LESIONS 15/>: CPT

## 2020-06-24 ASSESSMENT — LOCATION DETAILED DESCRIPTION DERM
LOCATION DETAILED: LEFT INFERIOR CENTRAL MALAR CHEEK
LOCATION DETAILED: RIGHT LATERAL FOREHEAD
LOCATION DETAILED: RIGHT CENTRAL FRONTAL SCALP
LOCATION DETAILED: LEFT ANTERIOR SHOULDER
LOCATION DETAILED: LEFT CENTRAL MALAR CHEEK
LOCATION DETAILED: LEFT LATERAL MALAR CHEEK
LOCATION DETAILED: LEFT INFERIOR LATERAL MALAR CHEEK
LOCATION DETAILED: RIGHT SUPERIOR FOREHEAD
LOCATION DETAILED: RIGHT ANTERIOR SHOULDER
LOCATION DETAILED: RIGHT LATERAL MALAR CHEEK
LOCATION DETAILED: LEFT CENTRAL FRONTAL SCALP
LOCATION DETAILED: NASAL DORSUM
LOCATION DETAILED: RIGHT CENTRAL MALAR CHEEK

## 2020-06-24 ASSESSMENT — LOCATION SIMPLE DESCRIPTION DERM
LOCATION SIMPLE: RIGHT FOREHEAD
LOCATION SIMPLE: LEFT SHOULDER
LOCATION SIMPLE: RIGHT CHEEK
LOCATION SIMPLE: LEFT CHEEK
LOCATION SIMPLE: NOSE
LOCATION SIMPLE: RIGHT SHOULDER
LOCATION SIMPLE: LEFT SCALP
LOCATION SIMPLE: RIGHT SCALP

## 2020-06-24 ASSESSMENT — LOCATION ZONE DERM
LOCATION ZONE: FACE
LOCATION ZONE: ARM
LOCATION ZONE: NOSE
LOCATION ZONE: SCALP

## 2020-06-24 NOTE — PROCEDURE: LIQUID NITROGEN
Render Note In Bullet Format When Appropriate: No
Duration Of Freeze Thaw-Cycle (Seconds): 5
Post-Care Instructions: I reviewed with the patient in detail post-care instructions. Patient is to wear sunprotection, and avoid picking at any of the treated lesions. Pt may apply Vaseline to crusted or scabbing areas.
Detail Level: Detailed
Number Of Freeze-Thaw Cycles: 1 freeze-thaw cycle
Render Post-Care Instructions In Note?: yes
Consent: The patient's consent was obtained including but not limited to risks of crusting, scabbing, blistering, scarring, darker or lighter pigmentary change, recurrence, incomplete removal and infection.

## 2020-06-24 NOTE — PROCEDURE: SHAVE REMOVAL
Medical Necessity Information: It is in your best interest to select a reason for this procedure from the list below. All of these items fulfill various CMS LCD requirements except the new and changing color options.
Medical Necessity Clause: This procedure was medically necessary because the lesion that was treated was:
Lab: 6
Lab Facility: 3
Detail Level: Detailed
Was A Bandage Applied: Yes
Size Of Lesion In Cm (Required): 1.4
X Size Of Lesion In Cm (Optional): 0
Biopsy Method: Dermablade
Anesthesia Type: 1% lidocaine without epinephrine
Anesthesia Volume In Cc: 1
Hemostasis: Electrocautery and Aluminum Chloride
Wound Care: Petrolatum
Path Notes (To The Dermatopathologist): Please check margins.
Render Path Notes In Note?: No
Consent was obtained from the patient. The risks and benefits to therapy were discussed in detail. Specifically, the risks of infection, scarring, bleeding, prolonged wound healing, incomplete removal, allergy to anesthesia, nerve injury and recurrence were addressed. Prior to the procedure, the treatment site was clearly identified and confirmed by the patient. All components of Universal Protocol/PAUSE Rule completed.
Post-Care Instructions: I reviewed with the patient in detail post-care instructions. Patient is to keep the biopsy site dry overnight, and then apply bacitracin twice daily until healed. Patient may apply hydrogen peroxide soaks to remove any crusting.
Notification Instructions: Patient will be notified of biopsy results. However, patient instructed to call the office if not contacted within 2 weeks.
Billing Type: Third-Party Bill

## 2020-06-24 NOTE — HPI: HISTORY OF BASAL CELL CARCINOMA
What Is The Reason For Today's Visit?: History of Basal Cell Carcinoma
How Many Bccs Have You Had?: more than one
When Was Your Last Cancer Diagnosed?: 10/07/19

## 2020-06-27 DIAGNOSIS — I10 ESSENTIAL HYPERTENSION: ICD-10-CM

## 2020-06-29 RX ORDER — AMLODIPINE BESYLATE 10 MG/1
TABLET ORAL
Qty: 90 TABLET | Refills: 0 | Status: SHIPPED | OUTPATIENT
Start: 2020-06-29 | End: 2020-09-28

## 2020-07-08 DIAGNOSIS — I10 ESSENTIAL HYPERTENSION: ICD-10-CM

## 2020-07-09 RX ORDER — LISINOPRIL 5 MG/1
TABLET ORAL
Qty: 90 TABLET | Refills: 3 | Status: SHIPPED | OUTPATIENT
Start: 2020-07-09 | End: 2021-07-08 | Stop reason: SDUPTHER

## 2020-07-23 ENCOUNTER — APPOINTMENT (RX ONLY)
Dept: URBAN - NONMETROPOLITAN AREA CLINIC 4 | Facility: CLINIC | Age: 60
Setting detail: DERMATOLOGY
End: 2020-07-23

## 2020-07-23 PROBLEM — C44.519 BASAL CELL CARCINOMA OF SKIN OF OTHER PART OF TRUNK: Status: ACTIVE | Noted: 2020-07-23

## 2020-07-23 PROCEDURE — 11602 EXC TR-EXT MAL+MARG 1.1-2 CM: CPT

## 2020-07-23 PROCEDURE — 13101 CMPLX RPR TRUNK 2.6-7.5 CM: CPT

## 2020-07-23 PROCEDURE — ? EXCISION

## 2020-07-30 ENCOUNTER — RX ONLY (OUTPATIENT)
Age: 60
Setting detail: RX ONLY
End: 2020-07-30

## 2020-07-30 RX ORDER — CEPHALEXIN 500 MG/1
CAPSULE ORAL
Qty: 20 | Refills: 1 | Status: ERX | COMMUNITY
Start: 2020-07-30

## 2020-08-06 ENCOUNTER — APPOINTMENT (RX ONLY)
Dept: URBAN - NONMETROPOLITAN AREA CLINIC 4 | Facility: CLINIC | Age: 60
Setting detail: DERMATOLOGY
End: 2020-08-06

## 2020-08-06 DIAGNOSIS — Z48.817 ENCOUNTER FOR SURGICAL AFTERCARE FOLLOWING SURGERY ON THE SKIN AND SUBCUTANEOUS TISSUE: ICD-10-CM

## 2020-08-06 DIAGNOSIS — L57.0 ACTINIC KERATOSIS: ICD-10-CM

## 2020-08-06 PROCEDURE — ? COUNSELING

## 2020-08-06 PROCEDURE — 17000 DESTRUCT PREMALG LESION: CPT

## 2020-08-06 PROCEDURE — 99024 POSTOP FOLLOW-UP VISIT: CPT

## 2020-08-06 PROCEDURE — ? POST-OP WOUND CHECK

## 2020-08-06 PROCEDURE — ? LIQUID NITROGEN

## 2020-08-06 ASSESSMENT — LOCATION SIMPLE DESCRIPTION DERM
LOCATION SIMPLE: RIGHT UPPER BACK
LOCATION SIMPLE: RIGHT FOREARM

## 2020-08-06 ASSESSMENT — LOCATION DETAILED DESCRIPTION DERM
LOCATION DETAILED: RIGHT DISTAL DORSAL FOREARM
LOCATION DETAILED: RIGHT SUPERIOR UPPER BACK

## 2020-08-06 ASSESSMENT — LOCATION ZONE DERM
LOCATION ZONE: TRUNK
LOCATION ZONE: ARM

## 2020-08-06 NOTE — PROCEDURE: POST-OP WOUND CHECK
Detail Level: Detailed
Add 64441 Cpt? (Important Note: In 2017 The Use Of 48686 Is Being Tracked By Cms To Determine Future Global Period Reimbursement For Global Periods): yes
Wound Evaluated By: Ángel Francois PA-C

## 2020-08-06 NOTE — PROCEDURE: LIQUID NITROGEN
Render Post-Care Instructions In Note?: yes
Post-Care Instructions: I reviewed with the patient in detail post-care instructions. Patient is to wear sunprotection, and avoid picking at any of the treated lesions. Pt may apply Vaseline to crusted or scabbing areas.
Detail Level: Detailed
Consent: The patient's consent was obtained including but not limited to risks of crusting, scabbing, blistering, scarring, darker or lighter pigmentary change, recurrence, incomplete removal and infection.
Number Of Freeze-Thaw Cycles: 1 freeze-thaw cycle
Duration Of Freeze Thaw-Cycle (Seconds): 5
Render Note In Bullet Format When Appropriate: No

## 2020-08-24 ENCOUNTER — TELEPHONE (OUTPATIENT)
Dept: FAMILY MEDICINE CLINIC | Facility: CLINIC | Age: 60
End: 2020-08-24

## 2020-09-08 ENCOUNTER — APPOINTMENT (RX ONLY)
Dept: URBAN - NONMETROPOLITAN AREA CLINIC 4 | Facility: CLINIC | Age: 60
Setting detail: DERMATOLOGY
End: 2020-09-08

## 2020-09-08 DIAGNOSIS — Z85.828 PERSONAL HISTORY OF OTHER MALIGNANT NEOPLASM OF SKIN: ICD-10-CM

## 2020-09-08 DIAGNOSIS — Z87.2 PERSONAL HISTORY OF DISEASES OF THE SKIN AND SUBCUTANEOUS TISSUE: ICD-10-CM

## 2020-09-08 PROCEDURE — ? TREATMENT REGIMEN

## 2020-09-08 PROCEDURE — ? OBSERVATION

## 2020-09-08 PROCEDURE — ? COUNSELING

## 2020-09-08 PROCEDURE — 99213 OFFICE O/P EST LOW 20 MIN: CPT

## 2020-09-08 ASSESSMENT — LOCATION DETAILED DESCRIPTION DERM
LOCATION DETAILED: RIGHT SUPERIOR UPPER BACK
LOCATION DETAILED: LEFT PROXIMAL DORSAL FOREARM

## 2020-09-08 ASSESSMENT — LOCATION SIMPLE DESCRIPTION DERM
LOCATION SIMPLE: RIGHT UPPER BACK
LOCATION SIMPLE: LEFT FOREARM

## 2020-09-08 ASSESSMENT — LOCATION ZONE DERM
LOCATION ZONE: ARM
LOCATION ZONE: TRUNK

## 2020-09-26 DIAGNOSIS — I10 ESSENTIAL HYPERTENSION: ICD-10-CM

## 2020-09-27 ENCOUNTER — APPOINTMENT (OUTPATIENT)
Dept: LAB | Facility: HOSPITAL | Age: 60
End: 2020-09-27
Payer: COMMERCIAL

## 2020-09-27 DIAGNOSIS — E78.2 MIXED HYPERLIPIDEMIA: ICD-10-CM

## 2020-09-27 LAB
CHOLEST SERPL-MCNC: 219 MG/DL (ref 50–200)
HDLC SERPL-MCNC: 65 MG/DL
LDLC SERPL CALC-MCNC: 136 MG/DL (ref 0–100)
NONHDLC SERPL-MCNC: 154 MG/DL
TRIGL SERPL-MCNC: 89 MG/DL

## 2020-09-27 PROCEDURE — 36415 COLL VENOUS BLD VENIPUNCTURE: CPT

## 2020-09-27 PROCEDURE — 80061 LIPID PANEL: CPT

## 2020-09-28 RX ORDER — AMLODIPINE BESYLATE 10 MG/1
TABLET ORAL
Qty: 90 TABLET | Refills: 3 | Status: SHIPPED | OUTPATIENT
Start: 2020-09-28

## 2020-09-30 ENCOUNTER — PREP FOR PROCEDURE (OUTPATIENT)
Dept: OBGYN CLINIC | Facility: OTHER | Age: 60
End: 2020-09-30

## 2020-09-30 DIAGNOSIS — Z01.818 PREOPERATIVE EXAMINATION: ICD-10-CM

## 2020-09-30 DIAGNOSIS — M17.12 PRIMARY OSTEOARTHRITIS OF LEFT KNEE: Primary | ICD-10-CM

## 2020-10-12 ENCOUNTER — APPOINTMENT (RX ONLY)
Dept: URBAN - NONMETROPOLITAN AREA CLINIC 4 | Facility: CLINIC | Age: 60
Setting detail: DERMATOLOGY
End: 2020-10-12

## 2020-10-12 DIAGNOSIS — Z71.89 OTHER SPECIFIED COUNSELING: ICD-10-CM

## 2020-10-12 DIAGNOSIS — L81.4 OTHER MELANIN HYPERPIGMENTATION: ICD-10-CM

## 2020-10-12 DIAGNOSIS — L82.1 OTHER SEBORRHEIC KERATOSIS: ICD-10-CM

## 2020-10-12 DIAGNOSIS — L82.0 INFLAMED SEBORRHEIC KERATOSIS: ICD-10-CM

## 2020-10-12 DIAGNOSIS — D18.0 HEMANGIOMA: ICD-10-CM

## 2020-10-12 DIAGNOSIS — D22 MELANOCYTIC NEVI: ICD-10-CM

## 2020-10-12 DIAGNOSIS — L57.0 ACTINIC KERATOSIS: ICD-10-CM

## 2020-10-12 DIAGNOSIS — Z85.828 PERSONAL HISTORY OF OTHER MALIGNANT NEOPLASM OF SKIN: ICD-10-CM

## 2020-10-12 PROBLEM — D18.01 HEMANGIOMA OF SKIN AND SUBCUTANEOUS TISSUE: Status: ACTIVE | Noted: 2020-10-12

## 2020-10-12 PROBLEM — D22.9 MELANOCYTIC NEVI, UNSPECIFIED: Status: ACTIVE | Noted: 2020-10-12

## 2020-10-12 PROCEDURE — ? COUNSELING

## 2020-10-12 PROCEDURE — 17003 DESTRUCT PREMALG LES 2-14: CPT | Mod: 59

## 2020-10-12 PROCEDURE — 17110 DESTRUCTION B9 LES UP TO 14: CPT

## 2020-10-12 PROCEDURE — 17000 DESTRUCT PREMALG LESION: CPT | Mod: 59

## 2020-10-12 PROCEDURE — 99213 OFFICE O/P EST LOW 20 MIN: CPT | Mod: 25

## 2020-10-12 PROCEDURE — ? SUNSCREEN RECOMMENDATIONS

## 2020-10-12 PROCEDURE — ? FULL BODY SKIN EXAM

## 2020-10-12 PROCEDURE — ? LIQUID NITROGEN

## 2020-10-12 ASSESSMENT — LOCATION ZONE DERM
LOCATION ZONE: ARM
LOCATION ZONE: EAR
LOCATION ZONE: NECK
LOCATION ZONE: TRUNK
LOCATION ZONE: FACE

## 2020-10-12 ASSESSMENT — LOCATION SIMPLE DESCRIPTION DERM
LOCATION SIMPLE: SUPERIOR FOREHEAD
LOCATION SIMPLE: LEFT UPPER BACK
LOCATION SIMPLE: RIGHT SHOULDER
LOCATION SIMPLE: LEFT EAR
LOCATION SIMPLE: CHEST
LOCATION SIMPLE: RIGHT ANTERIOR NECK
LOCATION SIMPLE: RIGHT TEMPLE
LOCATION SIMPLE: ABDOMEN
LOCATION SIMPLE: RIGHT UPPER BACK
LOCATION SIMPLE: RIGHT CHEEK
LOCATION SIMPLE: RIGHT FOREHEAD
LOCATION SIMPLE: LEFT ANTERIOR NECK
LOCATION SIMPLE: LEFT FOREHEAD
LOCATION SIMPLE: LEFT CHEEK

## 2020-10-12 ASSESSMENT — LOCATION DETAILED DESCRIPTION DERM
LOCATION DETAILED: RIGHT MEDIAL FOREHEAD
LOCATION DETAILED: SUPERIOR MID FOREHEAD
LOCATION DETAILED: RIGHT SUPERIOR UPPER BACK
LOCATION DETAILED: RIGHT INFERIOR LATERAL MALAR CHEEK
LOCATION DETAILED: RIGHT CLAVICULAR NECK
LOCATION DETAILED: LEFT SUPERIOR UPPER BACK
LOCATION DETAILED: RIGHT ANTERIOR SHOULDER
LOCATION DETAILED: RIGHT INFERIOR CENTRAL MALAR CHEEK
LOCATION DETAILED: PERIUMBILICAL SKIN
LOCATION DETAILED: RIGHT INFERIOR LATERAL FOREHEAD
LOCATION DETAILED: LEFT INFERIOR CENTRAL MALAR CHEEK
LOCATION DETAILED: LEFT SUPERIOR ANTERIOR NECK
LOCATION DETAILED: RIGHT SUPERIOR TEMPLE
LOCATION DETAILED: LEFT SUPERIOR LATERAL BUCCAL CHEEK
LOCATION DETAILED: RIGHT SUPERIOR LATERAL BUCCAL CHEEK
LOCATION DETAILED: LEFT FOREHEAD
LOCATION DETAILED: LEFT INFERIOR MEDIAL UPPER BACK
LOCATION DETAILED: STERNAL NOTCH
LOCATION DETAILED: LEFT CLAVICULAR NECK
LOCATION DETAILED: LEFT ANTITRAGUS

## 2020-10-12 NOTE — PROCEDURE: LIQUID NITROGEN
Consent: The patient's consent was obtained including but not limited to risks of crusting, scabbing, blistering, scarring, darker or lighter pigmentary change, recurrence, incomplete removal and infection.
Render Note In Bullet Format When Appropriate: No
Duration Of Freeze Thaw-Cycle (Seconds): 5
Number Of Freeze-Thaw Cycles: 1 freeze-thaw cycle
Post-Care Instructions: I reviewed with the patient in detail post-care instructions. Patient is to wear sunprotection, and avoid picking at any of the treated lesions. Pt may apply Vaseline to crusted or scabbing areas.
Detail Level: Zone
Render Post-Care Instructions In Note?: yes
Duration Of Freeze Thaw-Cycle (Seconds): 5-10
Medical Necessity Clause: This procedure was medically necessary because the lesions that were treated were:
Medical Necessity Information: It is in your best interest to select a reason for this procedure from the list below. All of these items fulfill various CMS LCD requirements except the new and changing color options.

## 2020-11-10 DIAGNOSIS — Z11.59 SCREENING FOR VIRAL DISEASE: Primary | ICD-10-CM

## 2020-11-16 ENCOUNTER — APPOINTMENT (RX ONLY)
Dept: URBAN - NONMETROPOLITAN AREA CLINIC 4 | Facility: CLINIC | Age: 60
Setting detail: DERMATOLOGY
End: 2020-11-16

## 2020-11-16 DIAGNOSIS — Z87.2 PERSONAL HISTORY OF DISEASES OF THE SKIN AND SUBCUTANEOUS TISSUE: ICD-10-CM

## 2020-11-16 PROCEDURE — 99213 OFFICE O/P EST LOW 20 MIN: CPT

## 2020-11-16 PROCEDURE — ? TREATMENT REGIMEN

## 2020-11-16 PROCEDURE — ? COUNSELING

## 2020-11-16 ASSESSMENT — LOCATION DETAILED DESCRIPTION DERM: LOCATION DETAILED: LEFT CRUS OF HELIX

## 2020-11-16 ASSESSMENT — LOCATION ZONE DERM: LOCATION ZONE: EAR

## 2020-11-16 ASSESSMENT — LOCATION SIMPLE DESCRIPTION DERM: LOCATION SIMPLE: LEFT EAR

## 2020-11-17 ENCOUNTER — TRANSCRIBE ORDERS (OUTPATIENT)
Dept: ADMINISTRATIVE | Facility: HOSPITAL | Age: 60
End: 2020-11-17

## 2020-11-17 ENCOUNTER — HOSPITAL ENCOUNTER (OUTPATIENT)
Dept: RADIOLOGY | Facility: HOSPITAL | Age: 60
Discharge: HOME | End: 2020-11-17
Attending: ORTHOPAEDIC SURGERY
Payer: COMMERCIAL

## 2020-11-17 ENCOUNTER — HOSPITAL ENCOUNTER (OUTPATIENT)
Dept: NON INVASIVE DIAGNOSTICS | Facility: HOSPITAL | Age: 60
Discharge: HOME | End: 2020-11-17
Attending: ORTHOPAEDIC SURGERY
Payer: COMMERCIAL

## 2020-11-17 ENCOUNTER — APPOINTMENT (OUTPATIENT)
Dept: LAB | Facility: HOSPITAL | Age: 60
End: 2020-11-17
Attending: ORTHOPAEDIC SURGERY
Payer: COMMERCIAL

## 2020-11-17 DIAGNOSIS — Z01.89 RADIOLOGICAL EXAMINATION, NOT ELSEWHERE CLASSIFIED: ICD-10-CM

## 2020-11-17 DIAGNOSIS — Z01.89 ENCOUNTER FOR OTHER SPECIFIED SPECIAL EXAMINATIONS: ICD-10-CM

## 2020-11-17 DIAGNOSIS — Z01.89 RADIOLOGICAL EXAMINATION, NOT ELSEWHERE CLASSIFIED: Primary | ICD-10-CM

## 2020-11-17 LAB
ALBUMIN SERPL BCP-MCNC: 4.2 G/DL (ref 3.5–5)
ALP SERPL-CCNC: 97 U/L (ref 46–116)
ALT SERPL W P-5'-P-CCNC: 37 U/L (ref 12–78)
ANION GAP SERPL CALCULATED.3IONS-SCNC: 5 MMOL/L (ref 4–13)
APTT PPP: 29 SECONDS (ref 23–37)
AST SERPL W P-5'-P-CCNC: 18 U/L (ref 5–45)
BASOPHILS # BLD AUTO: 0.06 THOUSANDS/ΜL (ref 0–0.1)
BASOPHILS NFR BLD AUTO: 1 % (ref 0–1)
BILIRUB SERPL-MCNC: 1.1 MG/DL (ref 0.2–1)
BILIRUB UR QL STRIP: NEGATIVE
BUN SERPL-MCNC: 15 MG/DL (ref 5–25)
CALCIUM SERPL-MCNC: 8.8 MG/DL (ref 8.3–10.1)
CHLORIDE SERPL-SCNC: 102 MMOL/L (ref 100–108)
CLARITY UR: CLEAR
CO2 SERPL-SCNC: 30 MMOL/L (ref 21–32)
COLOR UR: NORMAL
CREAT SERPL-MCNC: 0.86 MG/DL (ref 0.6–1.3)
CRP SERPL QL: 3.6 MG/L
EOSINOPHIL # BLD AUTO: 0.62 THOUSAND/ΜL (ref 0–0.61)
EOSINOPHIL NFR BLD AUTO: 10 % (ref 0–6)
ERYTHROCYTE [DISTWIDTH] IN BLOOD BY AUTOMATED COUNT: 13 % (ref 11.6–15.1)
FERRITIN SERPL-MCNC: 169 NG/ML (ref 8–388)
GFR SERPL CREATININE-BSD FRML MDRD: 93 ML/MIN/1.73SQ M
GLUCOSE SERPL-MCNC: 85 MG/DL (ref 65–140)
GLUCOSE UR STRIP-MCNC: NEGATIVE MG/DL
HCT VFR BLD AUTO: 45.6 % (ref 36.5–49.3)
HGB BLD-MCNC: 15.4 G/DL (ref 12–17)
HGB UR QL STRIP.AUTO: NEGATIVE
IMM GRANULOCYTES # BLD AUTO: 0.02 THOUSAND/UL (ref 0–0.2)
IMM GRANULOCYTES NFR BLD AUTO: 0 % (ref 0–2)
INR PPP: 0.93 (ref 0.84–1.19)
IRON SATN MFR SERPL: 17 %
IRON SERPL-MCNC: 63 UG/DL (ref 65–175)
KETONES UR STRIP-MCNC: NEGATIVE MG/DL
LEUKOCYTE ESTERASE UR QL STRIP: NEGATIVE
LYMPHOCYTES # BLD AUTO: 2.45 THOUSANDS/ΜL (ref 0.6–4.47)
LYMPHOCYTES NFR BLD AUTO: 41 % (ref 14–44)
MCH RBC QN AUTO: 31.5 PG (ref 26.8–34.3)
MCHC RBC AUTO-ENTMCNC: 33.8 G/DL (ref 31.4–37.4)
MCV RBC AUTO: 93 FL (ref 82–98)
MONOCYTES # BLD AUTO: 0.79 THOUSAND/ΜL (ref 0.17–1.22)
MONOCYTES NFR BLD AUTO: 13 % (ref 4–12)
NEUTROPHILS # BLD AUTO: 2.14 THOUSANDS/ΜL (ref 1.85–7.62)
NEUTS SEG NFR BLD AUTO: 35 % (ref 43–75)
NITRITE UR QL STRIP: NEGATIVE
NRBC BLD AUTO-RTO: 0 /100 WBCS
PH UR STRIP.AUTO: 5.5 [PH]
PLATELET # BLD AUTO: 227 THOUSANDS/UL (ref 149–390)
PMV BLD AUTO: 9.6 FL (ref 8.9–12.7)
POTASSIUM SERPL-SCNC: 4.2 MMOL/L (ref 3.5–5.3)
PROT SERPL-MCNC: 8 G/DL (ref 6.4–8.2)
PROT UR STRIP-MCNC: NEGATIVE MG/DL
PROTHROMBIN TIME: 12.3 SECONDS (ref 11.6–14.5)
RBC # BLD AUTO: 4.89 MILLION/UL (ref 3.88–5.62)
SODIUM SERPL-SCNC: 137 MMOL/L (ref 136–145)
SP GR UR STRIP.AUTO: 1.01 (ref 1–1.03)
TIBC SERPL-MCNC: 373 UG/DL (ref 250–450)
UROBILINOGEN UR QL STRIP.AUTO: 0.2 E.U./DL
WBC # BLD AUTO: 6.08 THOUSAND/UL (ref 4.31–10.16)

## 2020-11-17 PROCEDURE — 85610 PROTHROMBIN TIME: CPT

## 2020-11-17 PROCEDURE — 85730 THROMBOPLASTIN TIME PARTIAL: CPT

## 2020-11-17 PROCEDURE — 36415 COLL VENOUS BLD VENIPUNCTURE: CPT

## 2020-11-17 PROCEDURE — 85025 COMPLETE CBC W/AUTO DIFF WBC: CPT

## 2020-11-17 PROCEDURE — 83550 IRON BINDING TEST: CPT

## 2020-11-17 PROCEDURE — 83540 ASSAY OF IRON: CPT

## 2020-11-17 PROCEDURE — 82728 ASSAY OF FERRITIN: CPT

## 2020-11-17 PROCEDURE — 81003 URINALYSIS AUTO W/O SCOPE: CPT

## 2020-11-17 PROCEDURE — 83036 HEMOGLOBIN GLYCOSYLATED A1C: CPT

## 2020-11-17 PROCEDURE — 93005 ELECTROCARDIOGRAM TRACING: CPT

## 2020-11-17 PROCEDURE — 80053 COMPREHEN METABOLIC PANEL: CPT

## 2020-11-17 PROCEDURE — 86140 C-REACTIVE PROTEIN: CPT

## 2020-11-17 PROCEDURE — 71046 X-RAY EXAM CHEST 2 VIEWS: CPT

## 2020-11-18 LAB
ATRIAL RATE: 65 BPM
EST. AVERAGE GLUCOSE BLD GHB EST-MCNC: 97 MG/DL
HBA1C MFR BLD: 5 %
P AXIS: 61 DEGREES
PR INTERVAL: 148 MS
QRS AXIS: 50 DEGREES
QRSD INTERVAL: 108 MS
QT INTERVAL: 390 MS
QTC INTERVAL: 405 MS
T WAVE AXIS: 59 DEGREES
VENTRICULAR RATE: 65 BPM

## 2020-11-18 PROCEDURE — 93010 ELECTROCARDIOGRAM REPORT: CPT | Performed by: INTERNAL MEDICINE

## 2020-11-18 RX ORDER — IBUPROFEN 200 MG
200-800 TABLET ORAL EVERY 6 HOURS PRN
COMMUNITY
End: 2020-12-03 | Stop reason: HOSPADM

## 2020-11-18 RX ORDER — TURMERIC ROOT EXTRACT 500 MG
TABLET ORAL DAILY
COMMUNITY
End: 2020-12-03 | Stop reason: HOSPADM

## 2020-11-19 ENCOUNTER — CONSULT (OUTPATIENT)
Dept: FAMILY MEDICINE CLINIC | Facility: CLINIC | Age: 60
End: 2020-11-19
Payer: COMMERCIAL

## 2020-11-19 VITALS
TEMPERATURE: 97.7 F | SYSTOLIC BLOOD PRESSURE: 110 MMHG | BODY MASS INDEX: 24.41 KG/M2 | OXYGEN SATURATION: 97 % | RESPIRATION RATE: 16 BRPM | DIASTOLIC BLOOD PRESSURE: 76 MMHG | WEIGHT: 175 LBS | HEART RATE: 70 BPM

## 2020-11-19 DIAGNOSIS — Z01.818 PRE-OP EXAM: Primary | ICD-10-CM

## 2020-11-19 DIAGNOSIS — M17.12 PRIMARY OSTEOARTHRITIS OF LEFT KNEE: ICD-10-CM

## 2020-11-19 DIAGNOSIS — I10 ESSENTIAL HYPERTENSION: ICD-10-CM

## 2020-11-19 PROCEDURE — 99214 OFFICE O/P EST MOD 30 MIN: CPT | Performed by: FAMILY MEDICINE

## 2020-11-25 DIAGNOSIS — Z11.59 SCREENING FOR VIRAL DISEASE: ICD-10-CM

## 2020-11-25 PROCEDURE — U0003 INFECTIOUS AGENT DETECTION BY NUCLEIC ACID (DNA OR RNA); SEVERE ACUTE RESPIRATORY SYNDROME CORONAVIRUS 2 (SARS-COV-2) (CORONAVIRUS DISEASE [COVID-19]), AMPLIFIED PROBE TECHNIQUE, MAKING USE OF HIGH THROUGHPUT TECHNOLOGIES AS DESCRIBED BY CMS-2020-01-R: HCPCS | Performed by: ORTHOPAEDIC SURGERY

## 2020-11-26 LAB — SARS-COV-2 RNA SPEC QL NAA+PROBE: NOT DETECTED

## 2020-12-01 ENCOUNTER — ANESTHESIA EVENT (OUTPATIENT)
Dept: PERIOP | Facility: HOSPITAL | Age: 60
End: 2020-12-01
Payer: COMMERCIAL

## 2020-12-02 ENCOUNTER — HOSPITAL ENCOUNTER (OUTPATIENT)
Facility: HOSPITAL | Age: 60
Setting detail: OUTPATIENT SURGERY
Discharge: HOME HEALTH | End: 2020-12-03
Attending: ORTHOPAEDIC SURGERY | Admitting: ORTHOPAEDIC SURGERY
Payer: COMMERCIAL

## 2020-12-02 ENCOUNTER — APPOINTMENT (OUTPATIENT)
Dept: RADIOLOGY | Facility: HOSPITAL | Age: 60
End: 2020-12-02
Payer: COMMERCIAL

## 2020-12-02 ENCOUNTER — ANESTHESIA (OUTPATIENT)
Dept: PERIOP | Facility: HOSPITAL | Age: 60
End: 2020-12-02
Payer: COMMERCIAL

## 2020-12-02 VITALS — HEART RATE: 66 BPM

## 2020-12-02 DIAGNOSIS — M17.12 PRIMARY OSTEOARTHRITIS OF LEFT KNEE: Primary | ICD-10-CM

## 2020-12-02 LAB
ABO GROUP BLD: NORMAL
ABO GROUP BLD: NORMAL
BLD GP AB SCN SERPL QL: NEGATIVE
RH BLD: POSITIVE
RH BLD: POSITIVE
SPECIMEN EXPIRATION DATE: NORMAL

## 2020-12-02 PROCEDURE — 86900 BLOOD TYPING SEROLOGIC ABO: CPT | Performed by: ORTHOPAEDIC SURGERY

## 2020-12-02 PROCEDURE — 86901 BLOOD TYPING SEROLOGIC RH(D): CPT | Performed by: ORTHOPAEDIC SURGERY

## 2020-12-02 PROCEDURE — C1776 JOINT DEVICE (IMPLANTABLE): HCPCS | Performed by: ORTHOPAEDIC SURGERY

## 2020-12-02 PROCEDURE — C1713 ANCHOR/SCREW BN/BN,TIS/BN: HCPCS | Performed by: ORTHOPAEDIC SURGERY

## 2020-12-02 PROCEDURE — 97163 PT EVAL HIGH COMPLEX 45 MIN: CPT

## 2020-12-02 PROCEDURE — 86850 RBC ANTIBODY SCREEN: CPT | Performed by: ORTHOPAEDIC SURGERY

## 2020-12-02 PROCEDURE — 90682 RIV4 VACC RECOMBINANT DNA IM: CPT | Performed by: ORTHOPAEDIC SURGERY

## 2020-12-02 PROCEDURE — 73560 X-RAY EXAM OF KNEE 1 OR 2: CPT

## 2020-12-02 PROCEDURE — 90471 IMMUNIZATION ADMIN: CPT | Performed by: ORTHOPAEDIC SURGERY

## 2020-12-02 DEVICE — ATTUNE KNEE SYSTEM TIBIAL INSERT ROTATING PLATFORM POSTERIOR STABILIZED 6 6MM AOX
Type: IMPLANTABLE DEVICE | Site: KNEE | Status: FUNCTIONAL
Brand: ATTUNE

## 2020-12-02 DEVICE — ATTUNE PATELLA MEDIALIZED ANATOMIC 35MM CEMENTED AOX
Type: IMPLANTABLE DEVICE | Site: PATELLA | Status: FUNCTIONAL
Brand: ATTUNE

## 2020-12-02 DEVICE — SMARTSET HIGH PERFORMANCE MV MEDIUM VISCOSITY BONE CEMENT 40G
Type: IMPLANTABLE DEVICE | Site: KNEE | Status: FUNCTIONAL
Brand: SMARTSET

## 2020-12-02 DEVICE — ATTUNE KNEE SYSTEM FEMORAL POSTERIOR STABILIZED SIZE 6 LEFT CEMENTED
Type: IMPLANTABLE DEVICE | Site: KNEE | Status: FUNCTIONAL
Brand: ATTUNE

## 2020-12-02 DEVICE — ATTUNE KNEE SYSTEM TIBIAL BASE ROTATING PLATFORM SIZE 6 CEMENTED
Type: IMPLANTABLE DEVICE | Site: KNEE | Status: FUNCTIONAL
Brand: ATTUNE

## 2020-12-02 RX ORDER — KETOROLAC TROMETHAMINE 30 MG/ML
15 INJECTION, SOLUTION INTRAMUSCULAR; INTRAVENOUS EVERY 6 HOURS PRN
Status: DISCONTINUED | OUTPATIENT
Start: 2020-12-02 | End: 2020-12-03 | Stop reason: HOSPADM

## 2020-12-02 RX ORDER — FERROUS SULFATE 325(65) MG
325 TABLET ORAL
Status: DISCONTINUED | OUTPATIENT
Start: 2020-12-03 | End: 2020-12-03 | Stop reason: HOSPADM

## 2020-12-02 RX ORDER — OXYCODONE HYDROCHLORIDE 5 MG/1
5 TABLET ORAL EVERY 4 HOURS PRN
Status: DISCONTINUED | OUTPATIENT
Start: 2020-12-02 | End: 2020-12-03 | Stop reason: HOSPADM

## 2020-12-02 RX ORDER — ACETAMINOPHEN 325 MG/1
650 TABLET ORAL EVERY 6 HOURS PRN
Status: DISCONTINUED | OUTPATIENT
Start: 2020-12-02 | End: 2020-12-03 | Stop reason: HOSPADM

## 2020-12-02 RX ORDER — DOCUSATE SODIUM 100 MG/1
100 CAPSULE, LIQUID FILLED ORAL 2 TIMES DAILY
Status: DISCONTINUED | OUTPATIENT
Start: 2020-12-02 | End: 2020-12-03 | Stop reason: HOSPADM

## 2020-12-02 RX ORDER — PROPOFOL 10 MG/ML
INJECTION, EMULSION INTRAVENOUS AS NEEDED
Status: DISCONTINUED | OUTPATIENT
Start: 2020-12-02 | End: 2020-12-02

## 2020-12-02 RX ORDER — AMLODIPINE BESYLATE 10 MG/1
10 TABLET ORAL EVERY EVENING
Status: DISCONTINUED | OUTPATIENT
Start: 2020-12-02 | End: 2020-12-03 | Stop reason: HOSPADM

## 2020-12-02 RX ORDER — CEFAZOLIN SODIUM 1 G/50ML
1000 SOLUTION INTRAVENOUS EVERY 8 HOURS
Status: DISCONTINUED | OUTPATIENT
Start: 2020-12-02 | End: 2020-12-02 | Stop reason: SDUPTHER

## 2020-12-02 RX ORDER — SENNOSIDES 8.6 MG
1 TABLET ORAL DAILY
Status: DISCONTINUED | OUTPATIENT
Start: 2020-12-03 | End: 2020-12-03 | Stop reason: HOSPADM

## 2020-12-02 RX ORDER — ROPIVACAINE HYDROCHLORIDE 5 MG/ML
INJECTION, SOLUTION EPIDURAL; INFILTRATION; PERINEURAL
Status: COMPLETED | OUTPATIENT
Start: 2020-12-02 | End: 2020-12-02

## 2020-12-02 RX ORDER — MAGNESIUM HYDROXIDE 1200 MG/15ML
LIQUID ORAL AS NEEDED
Status: DISCONTINUED | OUTPATIENT
Start: 2020-12-02 | End: 2020-12-02 | Stop reason: HOSPADM

## 2020-12-02 RX ORDER — ASPIRIN 325 MG
325 TABLET ORAL 2 TIMES DAILY
Status: DISCONTINUED | OUTPATIENT
Start: 2020-12-02 | End: 2020-12-03 | Stop reason: HOSPADM

## 2020-12-02 RX ORDER — PROPOFOL 10 MG/ML
INJECTION, EMULSION INTRAVENOUS CONTINUOUS PRN
Status: DISCONTINUED | OUTPATIENT
Start: 2020-12-02 | End: 2020-12-02

## 2020-12-02 RX ORDER — CEFAZOLIN SODIUM 1 G/50ML
1000 SOLUTION INTRAVENOUS EVERY 8 HOURS
Status: COMPLETED | OUTPATIENT
Start: 2020-12-02 | End: 2020-12-03

## 2020-12-02 RX ORDER — CEFAZOLIN SODIUM 2 G/50ML
2000 SOLUTION INTRAVENOUS ONCE
Status: COMPLETED | OUTPATIENT
Start: 2020-12-02 | End: 2020-12-02

## 2020-12-02 RX ORDER — CEFAZOLIN SODIUM 2 G/50ML
SOLUTION INTRAVENOUS AS NEEDED
Status: DISCONTINUED | OUTPATIENT
Start: 2020-12-02 | End: 2020-12-02

## 2020-12-02 RX ORDER — ONDANSETRON 2 MG/ML
INJECTION INTRAMUSCULAR; INTRAVENOUS AS NEEDED
Status: DISCONTINUED | OUTPATIENT
Start: 2020-12-02 | End: 2020-12-02

## 2020-12-02 RX ORDER — EPHEDRINE SULFATE 50 MG/ML
INJECTION INTRAVENOUS AS NEEDED
Status: DISCONTINUED | OUTPATIENT
Start: 2020-12-02 | End: 2020-12-02

## 2020-12-02 RX ORDER — FENTANYL CITRATE/PF 50 MCG/ML
25 SYRINGE (ML) INJECTION
Status: DISCONTINUED | OUTPATIENT
Start: 2020-12-02 | End: 2020-12-02 | Stop reason: HOSPADM

## 2020-12-02 RX ORDER — MIDAZOLAM HYDROCHLORIDE 2 MG/2ML
INJECTION, SOLUTION INTRAMUSCULAR; INTRAVENOUS AS NEEDED
Status: DISCONTINUED | OUTPATIENT
Start: 2020-12-02 | End: 2020-12-02

## 2020-12-02 RX ORDER — ONDANSETRON 2 MG/ML
4 INJECTION INTRAMUSCULAR; INTRAVENOUS EVERY 8 HOURS PRN
Status: DISCONTINUED | OUTPATIENT
Start: 2020-12-02 | End: 2020-12-03 | Stop reason: HOSPADM

## 2020-12-02 RX ORDER — HYDROMORPHONE HCL/PF 1 MG/ML
0.5 SYRINGE (ML) INJECTION EVERY 2 HOUR PRN
Status: DISCONTINUED | OUTPATIENT
Start: 2020-12-02 | End: 2020-12-03 | Stop reason: HOSPADM

## 2020-12-02 RX ORDER — SODIUM CHLORIDE 9 MG/ML
125 INJECTION, SOLUTION INTRAVENOUS CONTINUOUS
Status: DISCONTINUED | OUTPATIENT
Start: 2020-12-02 | End: 2020-12-03 | Stop reason: HOSPADM

## 2020-12-02 RX ORDER — FENTANYL CITRATE 50 UG/ML
INJECTION, SOLUTION INTRAMUSCULAR; INTRAVENOUS AS NEEDED
Status: DISCONTINUED | OUTPATIENT
Start: 2020-12-02 | End: 2020-12-02

## 2020-12-02 RX ORDER — BUPIVACAINE HYDROCHLORIDE 7.5 MG/ML
INJECTION, SOLUTION INTRASPINAL AS NEEDED
Status: DISCONTINUED | OUTPATIENT
Start: 2020-12-02 | End: 2020-12-02

## 2020-12-02 RX ORDER — LISINOPRIL 5 MG/1
5 TABLET ORAL EVERY EVENING
Status: DISCONTINUED | OUTPATIENT
Start: 2020-12-02 | End: 2020-12-03 | Stop reason: HOSPADM

## 2020-12-02 RX ORDER — SODIUM CHLORIDE, SODIUM LACTATE, POTASSIUM CHLORIDE, CALCIUM CHLORIDE 600; 310; 30; 20 MG/100ML; MG/100ML; MG/100ML; MG/100ML
100 INJECTION, SOLUTION INTRAVENOUS CONTINUOUS
Status: DISCONTINUED | OUTPATIENT
Start: 2020-12-02 | End: 2020-12-03 | Stop reason: HOSPADM

## 2020-12-02 RX ORDER — ALBUTEROL SULFATE 2.5 MG/3ML
2.5 SOLUTION RESPIRATORY (INHALATION) ONCE AS NEEDED
Status: DISCONTINUED | OUTPATIENT
Start: 2020-12-02 | End: 2020-12-02 | Stop reason: HOSPADM

## 2020-12-02 RX ORDER — OXYCODONE HYDROCHLORIDE 10 MG/1
10 TABLET ORAL EVERY 4 HOURS PRN
Status: DISCONTINUED | OUTPATIENT
Start: 2020-12-02 | End: 2020-12-03 | Stop reason: HOSPADM

## 2020-12-02 RX ADMIN — SODIUM CHLORIDE, SODIUM LACTATE, POTASSIUM CHLORIDE, AND CALCIUM CHLORIDE 100 ML/HR: .6; .31; .03; .02 INJECTION, SOLUTION INTRAVENOUS at 16:18

## 2020-12-02 RX ADMIN — EPHEDRINE SULFATE 5 MG: 50 INJECTION, SOLUTION INTRAVENOUS at 11:48

## 2020-12-02 RX ADMIN — EPHEDRINE SULFATE 5 MG: 50 INJECTION, SOLUTION INTRAVENOUS at 12:07

## 2020-12-02 RX ADMIN — CEFAZOLIN SODIUM 2000 MG: 2 SOLUTION INTRAVENOUS at 11:00

## 2020-12-02 RX ADMIN — DOCUSATE SODIUM 100 MG: 100 CAPSULE, LIQUID FILLED ORAL at 19:07

## 2020-12-02 RX ADMIN — OXYCODONE HYDROCHLORIDE 10 MG: 10 TABLET ORAL at 16:50

## 2020-12-02 RX ADMIN — ONDANSETRON 4 MG: 2 INJECTION INTRAMUSCULAR; INTRAVENOUS at 14:45

## 2020-12-02 RX ADMIN — FENTANYL CITRATE 100 MCG: 50 INJECTION, SOLUTION INTRAMUSCULAR; INTRAVENOUS at 10:46

## 2020-12-02 RX ADMIN — FENTANYL CITRATE 25 MCG: 50 INJECTION INTRAMUSCULAR; INTRAVENOUS at 13:19

## 2020-12-02 RX ADMIN — HYDROMORPHONE HYDROCHLORIDE 0.5 MG: 1 INJECTION, SOLUTION INTRAMUSCULAR; INTRAVENOUS; SUBCUTANEOUS at 14:35

## 2020-12-02 RX ADMIN — INFLUENZA A VIRUS A/HAWAII/70/2019 (H1N1) RECOMBINANT HEMAGGLUTININ ANTIGEN, INFLUENZA A VIRUS A/MINNESOTA/41/2019 (H3N2) RECOMBINANT HEMAGGLUTININ ANTIGEN, INFLUENZA B VIRUS B/WASHINGTON/02/2019 RECOMBINANT HEMAGGLUTININ ANTIGEN, AND INFLUENZA B VIRUS B/PHUKET/3073/2013 RECOMBINANT HEMAGGLUTININ ANTIGEN 0.5 ML: 45; 45; 45; 45 INJECTION INTRAMUSCULAR at 16:51

## 2020-12-02 RX ADMIN — MIDAZOLAM 2 MG: 1 INJECTION INTRAMUSCULAR; INTRAVENOUS at 10:46

## 2020-12-02 RX ADMIN — FENTANYL CITRATE 25 MCG: 50 INJECTION INTRAMUSCULAR; INTRAVENOUS at 13:34

## 2020-12-02 RX ADMIN — EPHEDRINE SULFATE 5 MG: 50 INJECTION, SOLUTION INTRAVENOUS at 11:58

## 2020-12-02 RX ADMIN — PROPOFOL 80 MCG/KG/MIN: 10 INJECTION, EMULSION INTRAVENOUS at 11:18

## 2020-12-02 RX ADMIN — ASPIRIN 325 MG ORAL TABLET 325 MG: 325 PILL ORAL at 21:10

## 2020-12-02 RX ADMIN — SODIUM CHLORIDE, SODIUM LACTATE, POTASSIUM CHLORIDE, AND CALCIUM CHLORIDE 100 ML/HR: .6; .31; .03; .02 INJECTION, SOLUTION INTRAVENOUS at 13:48

## 2020-12-02 RX ADMIN — BUPIVACAINE HYDROCHLORIDE IN DEXTROSE 1.6 ML: 7.5 INJECTION, SOLUTION SUBARACHNOID at 11:14

## 2020-12-02 RX ADMIN — ONDANSETRON 4 MG: 2 INJECTION INTRAMUSCULAR; INTRAVENOUS at 12:09

## 2020-12-02 RX ADMIN — SODIUM CHLORIDE 125 ML/HR: 0.9 INJECTION, SOLUTION INTRAVENOUS at 09:18

## 2020-12-02 RX ADMIN — EPHEDRINE SULFATE 5 MG: 50 INJECTION, SOLUTION INTRAVENOUS at 12:16

## 2020-12-02 RX ADMIN — ROPIVACAINE HYDROCHLORIDE 30 ML: 5 INJECTION, SOLUTION EPIDURAL; INFILTRATION; PERINEURAL at 10:49

## 2020-12-02 RX ADMIN — CEFAZOLIN SODIUM 2000 MG: 2 SOLUTION INTRAVENOUS at 10:53

## 2020-12-02 RX ADMIN — OXYCODONE HYDROCHLORIDE 10 MG: 10 TABLET ORAL at 21:10

## 2020-12-02 RX ADMIN — HYDROMORPHONE HYDROCHLORIDE 0.5 MG: 1 INJECTION, SOLUTION INTRAMUSCULAR; INTRAVENOUS; SUBCUTANEOUS at 23:13

## 2020-12-02 RX ADMIN — KETOROLAC TROMETHAMINE 15 MG: 30 INJECTION, SOLUTION INTRAMUSCULAR at 14:36

## 2020-12-02 RX ADMIN — CEFAZOLIN SODIUM 1000 MG: 1 SOLUTION INTRAVENOUS at 19:06

## 2020-12-02 RX ADMIN — PROPOFOL 50 MG: 10 INJECTION, EMULSION INTRAVENOUS at 11:18

## 2020-12-03 VITALS
RESPIRATION RATE: 18 BRPM | TEMPERATURE: 98.6 F | BODY MASS INDEX: 25.9 KG/M2 | HEIGHT: 71 IN | HEART RATE: 78 BPM | DIASTOLIC BLOOD PRESSURE: 82 MMHG | SYSTOLIC BLOOD PRESSURE: 140 MMHG | OXYGEN SATURATION: 98 % | WEIGHT: 185 LBS

## 2020-12-03 LAB
ANION GAP SERPL CALCULATED.3IONS-SCNC: 7 MMOL/L (ref 4–13)
BUN SERPL-MCNC: 11 MG/DL (ref 5–25)
CALCIUM SERPL-MCNC: 7.9 MG/DL (ref 8.3–10.1)
CHLORIDE SERPL-SCNC: 100 MMOL/L (ref 100–108)
CO2 SERPL-SCNC: 27 MMOL/L (ref 21–32)
CREAT SERPL-MCNC: 1.08 MG/DL (ref 0.6–1.3)
ERYTHROCYTE [DISTWIDTH] IN BLOOD BY AUTOMATED COUNT: 12.6 % (ref 11.6–15.1)
GFR SERPL CREATININE-BSD FRML MDRD: 73 ML/MIN/1.73SQ M
GLUCOSE SERPL-MCNC: 115 MG/DL (ref 65–140)
HCT VFR BLD AUTO: 35.5 % (ref 36.5–49.3)
HGB BLD-MCNC: 11.9 G/DL (ref 12–17)
MCH RBC QN AUTO: 31.6 PG (ref 26.8–34.3)
MCHC RBC AUTO-ENTMCNC: 33.5 G/DL (ref 31.4–37.4)
MCV RBC AUTO: 94 FL (ref 82–98)
PLATELET # BLD AUTO: 159 THOUSANDS/UL (ref 149–390)
PMV BLD AUTO: 9.8 FL (ref 8.9–12.7)
POTASSIUM SERPL-SCNC: 4 MMOL/L (ref 3.5–5.3)
RBC # BLD AUTO: 3.77 MILLION/UL (ref 3.88–5.62)
SODIUM SERPL-SCNC: 134 MMOL/L (ref 136–145)
WBC # BLD AUTO: 9.99 THOUSAND/UL (ref 4.31–10.16)

## 2020-12-03 PROCEDURE — 80048 BASIC METABOLIC PNL TOTAL CA: CPT | Performed by: PHYSICIAN ASSISTANT

## 2020-12-03 PROCEDURE — 85027 COMPLETE CBC AUTOMATED: CPT | Performed by: PHYSICIAN ASSISTANT

## 2020-12-03 PROCEDURE — 97110 THERAPEUTIC EXERCISES: CPT

## 2020-12-03 PROCEDURE — 97116 GAIT TRAINING THERAPY: CPT

## 2020-12-03 PROCEDURE — 97167 OT EVAL HIGH COMPLEX 60 MIN: CPT

## 2020-12-03 PROCEDURE — 97530 THERAPEUTIC ACTIVITIES: CPT

## 2020-12-03 RX ORDER — DOCUSATE SODIUM 100 MG/1
100 CAPSULE, LIQUID FILLED ORAL 2 TIMES DAILY
Qty: 10 CAPSULE | Refills: 0
Start: 2020-12-03

## 2020-12-03 RX ORDER — SENNOSIDES 8.6 MG
8.6 TABLET ORAL DAILY
Refills: 0
Start: 2020-12-03

## 2020-12-03 RX ORDER — ACETAMINOPHEN 325 MG/1
650 TABLET ORAL EVERY 6 HOURS PRN
Refills: 0
Start: 2020-12-03

## 2020-12-03 RX ORDER — ASPIRIN 325 MG
325 TABLET ORAL 2 TIMES DAILY
Refills: 0
Start: 2020-12-03

## 2020-12-03 RX ORDER — OXYCODONE HYDROCHLORIDE 5 MG/1
5 TABLET ORAL EVERY 4 HOURS PRN
Qty: 30 TABLET | Refills: 0
Start: 2020-12-03 | End: 2020-12-13

## 2020-12-03 RX ADMIN — AMLODIPINE BESYLATE 10 MG: 10 TABLET ORAL at 17:00

## 2020-12-03 RX ADMIN — OXYCODONE HYDROCHLORIDE 10 MG: 10 TABLET ORAL at 01:43

## 2020-12-03 RX ADMIN — ASPIRIN 325 MG ORAL TABLET 325 MG: 325 PILL ORAL at 08:52

## 2020-12-03 RX ADMIN — OXYCODONE HYDROCHLORIDE 10 MG: 10 TABLET ORAL at 13:33

## 2020-12-03 RX ADMIN — FERROUS SULFATE TAB 325 MG (65 MG ELEMENTAL FE) 325 MG: 325 (65 FE) TAB at 08:52

## 2020-12-03 RX ADMIN — LISINOPRIL 5 MG: 5 TABLET ORAL at 17:01

## 2020-12-03 RX ADMIN — ACETAMINOPHEN 650 MG: 325 TABLET, FILM COATED ORAL at 10:56

## 2020-12-03 RX ADMIN — SODIUM CHLORIDE, SODIUM LACTATE, POTASSIUM CHLORIDE, AND CALCIUM CHLORIDE 100 ML/HR: .6; .31; .03; .02 INJECTION, SOLUTION INTRAVENOUS at 03:57

## 2020-12-03 RX ADMIN — ACETAMINOPHEN 650 MG: 325 TABLET, FILM COATED ORAL at 17:01

## 2020-12-03 RX ADMIN — CEFAZOLIN SODIUM 1000 MG: 1 SOLUTION INTRAVENOUS at 03:50

## 2020-12-03 RX ADMIN — SENNOSIDES 8.6 MG: 8.6 TABLET ORAL at 08:52

## 2020-12-03 RX ADMIN — HYDROMORPHONE HYDROCHLORIDE 0.5 MG: 1 INJECTION, SOLUTION INTRAMUSCULAR; INTRAVENOUS; SUBCUTANEOUS at 06:45

## 2020-12-03 RX ADMIN — HYDROMORPHONE HYDROCHLORIDE 0.5 MG: 1 INJECTION, SOLUTION INTRAMUSCULAR; INTRAVENOUS; SUBCUTANEOUS at 03:49

## 2020-12-03 RX ADMIN — DOCUSATE SODIUM 100 MG: 100 CAPSULE, LIQUID FILLED ORAL at 17:00

## 2020-12-03 RX ADMIN — OXYCODONE HYDROCHLORIDE 10 MG: 10 TABLET ORAL at 08:52

## 2020-12-03 RX ADMIN — DOCUSATE SODIUM 100 MG: 100 CAPSULE, LIQUID FILLED ORAL at 08:52

## 2020-12-03 RX ADMIN — OXYCODONE HYDROCHLORIDE 10 MG: 10 TABLET ORAL at 05:19

## 2020-12-08 ENCOUNTER — TELEPHONE (OUTPATIENT)
Dept: FAMILY MEDICINE CLINIC | Facility: CLINIC | Age: 60
End: 2020-12-08

## 2020-12-08 DIAGNOSIS — N39.0 URINARY TRACT INFECTION WITHOUT HEMATURIA, SITE UNSPECIFIED: Primary | ICD-10-CM

## 2020-12-08 RX ORDER — CIPROFLOXACIN 500 MG/1
500 TABLET, FILM COATED ORAL EVERY 12 HOURS SCHEDULED
Qty: 14 TABLET | Refills: 0 | Status: SHIPPED | OUTPATIENT
Start: 2020-12-08 | End: 2020-12-15

## 2020-12-23 ENCOUNTER — EVALUATION (OUTPATIENT)
Dept: PHYSICAL THERAPY | Facility: CLINIC | Age: 60
End: 2020-12-23
Payer: COMMERCIAL

## 2020-12-23 ENCOUNTER — TRANSCRIBE ORDERS (OUTPATIENT)
Dept: PHYSICAL THERAPY | Facility: CLINIC | Age: 60
End: 2020-12-23

## 2020-12-23 DIAGNOSIS — Z47.1 AFTERCARE FOLLOWING LEFT KNEE JOINT REPLACEMENT SURGERY: Primary | ICD-10-CM

## 2020-12-23 DIAGNOSIS — M25.562 ACUTE PAIN OF LEFT KNEE: ICD-10-CM

## 2020-12-23 DIAGNOSIS — Z96.652 AFTERCARE FOLLOWING LEFT KNEE JOINT REPLACEMENT SURGERY: Primary | ICD-10-CM

## 2020-12-23 PROCEDURE — 97161 PT EVAL LOW COMPLEX 20 MIN: CPT | Performed by: PHYSICAL THERAPIST

## 2020-12-23 PROCEDURE — 97140 MANUAL THERAPY 1/> REGIONS: CPT | Performed by: PHYSICAL THERAPIST

## 2020-12-23 PROCEDURE — 97535 SELF CARE MNGMENT TRAINING: CPT | Performed by: PHYSICAL THERAPIST

## 2020-12-23 PROCEDURE — 97110 THERAPEUTIC EXERCISES: CPT | Performed by: PHYSICAL THERAPIST

## 2020-12-29 ENCOUNTER — OFFICE VISIT (OUTPATIENT)
Dept: PHYSICAL THERAPY | Facility: CLINIC | Age: 60
End: 2020-12-29
Payer: COMMERCIAL

## 2020-12-29 DIAGNOSIS — Z47.1 AFTERCARE FOLLOWING LEFT KNEE JOINT REPLACEMENT SURGERY: Primary | ICD-10-CM

## 2020-12-29 DIAGNOSIS — M25.562 ACUTE PAIN OF LEFT KNEE: ICD-10-CM

## 2020-12-29 DIAGNOSIS — Z96.652 AFTERCARE FOLLOWING LEFT KNEE JOINT REPLACEMENT SURGERY: Primary | ICD-10-CM

## 2020-12-29 PROCEDURE — 97110 THERAPEUTIC EXERCISES: CPT

## 2020-12-29 PROCEDURE — 97140 MANUAL THERAPY 1/> REGIONS: CPT

## 2020-12-29 PROCEDURE — 97530 THERAPEUTIC ACTIVITIES: CPT

## 2020-12-30 ENCOUNTER — OFFICE VISIT (OUTPATIENT)
Dept: PHYSICAL THERAPY | Facility: CLINIC | Age: 60
End: 2020-12-30
Payer: COMMERCIAL

## 2020-12-30 DIAGNOSIS — Z47.1 AFTERCARE FOLLOWING LEFT KNEE JOINT REPLACEMENT SURGERY: Primary | ICD-10-CM

## 2020-12-30 DIAGNOSIS — M25.562 ACUTE PAIN OF LEFT KNEE: ICD-10-CM

## 2020-12-30 DIAGNOSIS — Z96.652 AFTERCARE FOLLOWING LEFT KNEE JOINT REPLACEMENT SURGERY: Primary | ICD-10-CM

## 2020-12-30 PROCEDURE — 97530 THERAPEUTIC ACTIVITIES: CPT

## 2020-12-30 PROCEDURE — 97110 THERAPEUTIC EXERCISES: CPT

## 2020-12-30 PROCEDURE — 97140 MANUAL THERAPY 1/> REGIONS: CPT

## 2021-01-05 ENCOUNTER — OFFICE VISIT (OUTPATIENT)
Dept: PHYSICAL THERAPY | Facility: CLINIC | Age: 61
End: 2021-01-05
Payer: COMMERCIAL

## 2021-01-05 DIAGNOSIS — M25.562 ACUTE PAIN OF LEFT KNEE: ICD-10-CM

## 2021-01-05 DIAGNOSIS — Z96.652 AFTERCARE FOLLOWING LEFT KNEE JOINT REPLACEMENT SURGERY: Primary | ICD-10-CM

## 2021-01-05 DIAGNOSIS — Z47.1 AFTERCARE FOLLOWING LEFT KNEE JOINT REPLACEMENT SURGERY: Primary | ICD-10-CM

## 2021-01-05 PROCEDURE — 97140 MANUAL THERAPY 1/> REGIONS: CPT | Performed by: PHYSICAL THERAPIST

## 2021-01-05 PROCEDURE — 97530 THERAPEUTIC ACTIVITIES: CPT | Performed by: PHYSICAL THERAPIST

## 2021-01-05 PROCEDURE — 97112 NEUROMUSCULAR REEDUCATION: CPT | Performed by: PHYSICAL THERAPIST

## 2021-01-05 PROCEDURE — 97110 THERAPEUTIC EXERCISES: CPT | Performed by: PHYSICAL THERAPIST

## 2021-01-05 NOTE — PROGRESS NOTES
Daily Note     Today's date: 2021  Patient name: Marques Josue  : 1960  MRN: [id number]  Referring provider: Lilliam Chery MD  Dx:   Encounter Diagnosis     ICD-10-CM    1  Aftercare following left knee joint replacement surgery  Z47 1     Z96 652    2  Acute pain of left knee  M25 562                   Subjective: patient stated that he is doing well, he is noticing small improvements daily  Objective: See treatment diary below      Assessment: Tolerated treatment well   Patient demonstrated fatigue post treatment, exhibited good technique with therapeutic exercises and would benefit from continued PT      Plan: continue per plan of care       Precautions: S/P L Knee TKA 20    Daily Treatment Diary    HEP: Handout provided and discussed      Manuals 20         ART x15'   x15'         PROM/Stretch  15' 15'          IASTM             STM/Triggerpoint             JM                          Neuro Re-Ed             Standing 1/2 Roll calf stretch 6x20'' " '' 6x20''         SL Ecc HR             Prone Hangs with self over pressure flex and ext  15/10" holds 15/10" holds 15/10'' holds                                                                          Ther Ex             Long sit QS back and forth 3x10 3/10 3/10 3x10         HS into QS 3x10 3/10 3/10 into SLR 3x10         HR/TR 3x10 3/10 3/10 4x10         TB TKE 3x10 L3 3/10 L3 3/10 L3 3x10 L5         TB Heel to Toes  3/10 L3 3/10 L3 4x10 L5         Sit to stand   2/10 2x10         TRX Squat ROM    2x10 5''                      Bridge with Add squeeze             SL Bridge             Clam shells             SL Sit to Stand             BOSU SLB             Upside down BOSU SL squat holds             TB Lat Walks  Next session           Step ups/downs  6" 3/10 6" 3/10 8" 3/10; 6" 3x10                                   Ther Activity             TM             Bike  10' 10' 10'         NuStep Forward/backward heel to toe walk  10 laps 10 laps                       Gait Training              x10'                                                   Modalities             MHP             CP x10' 10' 10' x10'         US/Stim

## 2021-01-07 ENCOUNTER — OFFICE VISIT (OUTPATIENT)
Dept: PHYSICAL THERAPY | Facility: CLINIC | Age: 61
End: 2021-01-07
Payer: COMMERCIAL

## 2021-01-07 DIAGNOSIS — Z96.652 AFTERCARE FOLLOWING LEFT KNEE JOINT REPLACEMENT SURGERY: Primary | ICD-10-CM

## 2021-01-07 DIAGNOSIS — M25.562 ACUTE PAIN OF LEFT KNEE: ICD-10-CM

## 2021-01-07 DIAGNOSIS — Z47.1 AFTERCARE FOLLOWING LEFT KNEE JOINT REPLACEMENT SURGERY: Primary | ICD-10-CM

## 2021-01-07 PROCEDURE — 97140 MANUAL THERAPY 1/> REGIONS: CPT | Performed by: PHYSICAL THERAPIST

## 2021-01-07 PROCEDURE — 97110 THERAPEUTIC EXERCISES: CPT | Performed by: PHYSICAL THERAPIST

## 2021-01-07 PROCEDURE — 97112 NEUROMUSCULAR REEDUCATION: CPT | Performed by: PHYSICAL THERAPIST

## 2021-01-07 NOTE — PROGRESS NOTES
Daily Note     Today's date: 2021  Patient name: Tabitha Banuelos  : 1960  MRN: [id number]  Referring provider: Francis Devlin MD  Dx:   Encounter Diagnosis     ICD-10-CM    1  Aftercare following left knee joint replacement surgery  Z47 1     Z96 652    2  Acute pain of left knee  M25 562                   Subjective: Pt reports doing well  Progressing each visit  HEP going well  Anxious to continue making progress  Objective: See treatment diary below      Assessment: Tolerated treatment well  Patient demonstrated fatigue post treatment, exhibited good technique with therapeutic exercises and would benefit from continued PT      Plan: Continue per plan of care  Progress treatment as tolerated           Precautions: S/P L Knee TKA 20    Daily Treatment Diary    HEP: Handout provided and discussed      Manuals 20        ART x15'   x15' x15'        PROM/Stretch  15' 15'          IASTM             STM/Triggerpoint             JM                          Neuro Re-Ed             Standing 1/ Roll calf stretch 6x20'' 20" 20'' 6x20'' 6x20''        SL Ecc HR             Prone Hangs with self over pressure flex and ext  15/10" holds 15/10" holds 15/10'' holds 15x15'' holds        SLB AE     8x15''                                                            Ther Ex             Long sit QS back and forth 3x10 3/10 3/10 3x10 3x10        HS into QS 3x10 3/10 3/10 into SLR 3x10 4x10 into SLR        HR/TR 3x10 3/10 3/10 4x10 4x10        TB TKE 3x10 L3 3/10 L3 3/10 L3 3x10 L5 4x10 L5        TB Heel to Toes  3/10 L3 3/10 L3 4x10 L5 4x10 L5        Sit to stand   2/10 2x10 3x10        TRX Squat ROM    2x10 5'' 3x10 5''                     Bridge with Add squeeze             SL Bridge             Clam shells             SL Sit to Stand             BOSU SLB             Upside down BOSU SL squat holds             TB Lat Walks  Next session           Step ups/downs  6" 3/10 6" 3/10 8" 3/10; 6" 3x10 8'' 3x10 ea                                  Ther Activity             TM             Bike  10' 10' 10' x10'        NuStep             Forward/backward heel to toe walk  10 laps 10 laps                       Gait Training              x10'                                                   Modalities             MHP             CP x10' 10' 10' x10' x10'        US/Stim

## 2021-01-12 ENCOUNTER — OFFICE VISIT (OUTPATIENT)
Dept: PHYSICAL THERAPY | Facility: CLINIC | Age: 61
End: 2021-01-12
Payer: COMMERCIAL

## 2021-01-12 DIAGNOSIS — M25.562 ACUTE PAIN OF LEFT KNEE: ICD-10-CM

## 2021-01-12 DIAGNOSIS — Z96.652 AFTERCARE FOLLOWING LEFT KNEE JOINT REPLACEMENT SURGERY: Primary | ICD-10-CM

## 2021-01-12 DIAGNOSIS — Z47.1 AFTERCARE FOLLOWING LEFT KNEE JOINT REPLACEMENT SURGERY: Primary | ICD-10-CM

## 2021-01-12 PROCEDURE — 97110 THERAPEUTIC EXERCISES: CPT

## 2021-01-12 PROCEDURE — 97530 THERAPEUTIC ACTIVITIES: CPT

## 2021-01-12 PROCEDURE — 97140 MANUAL THERAPY 1/> REGIONS: CPT

## 2021-01-12 NOTE — PROGRESS NOTES
Daily Note     Today's date: 2021  Patient name: Adaline Prader  : 1960  MRN: [id number]  Referring provider: Adeline Rodriguez MD  Dx: No diagnosis found  Subjective: pt reports he cont to do well with program  Reports only having trouble sleeping  Objective: See treatment diary below      Assessment: Tolerated treatment well  Patient demonstrated fatigue post treatment  Pt cont to make gains with strength and ROM  Pt progressing toward goals  Plan: Continue per plan of care          Precautions: S/P L Knee TKA 20    Daily Treatment Diary    HEP: Handout provided and discussed      Manuals 20       ART x15'   x15' x15'        PROM/Stretch  15' 15'   15'       IASTM             STM/Triggerpoint             JM                          Neuro Re-Ed             Standing / Roll calf stretch 6x20'' " '' 6x20'' 6x20'' "        SL Ecc HR             Prone Hangs with self over pressure flex and ext  15/10" holds 15/10" holds 15/10'' holds 15x15'' holds 15/15" holds       SLB AE     8x15'' 815"                                                           Ther Ex             Long sit QS back and forth 3x10 3/10 3/10 3x10 3x10        HS into QS 3x10 3/10 3/10 into SLR 3x10 4x10 into SLR 4/10 SLR       HR/TR 3x10 3/10 3/10 4x10 4x10        TB TKE 3x10 L3 3/10 L3 3/10 L3 3x10 L5 4x10 L5 4/10 L5       TB Heel to Toes  3/10 L3 3/10 L3 4x10 L5 4x10 L5 4/10 L5       Sit to stand   2/10 2x10 3x10 3/10       TRX Squat ROM    2x10 5'' 3x10 5'' 3/10 5"                    Bridge with Add squeeze             SL Bridge             Clam shells             SL Sit to Stand             BOSU SLB             Upside down BOSU SL squat holds             TB Lat Walks  Next session           Step ups/downs  6" 3/10 6" 3/10 8" 3/10; 6" 3x10 8'' 3x10 ea 8" 3/10 ea                                 Ther Activity             TM             Bike  10' 10' 10' x10' 10' NuStep             Forward/backward heel to toe walk  10 laps 10 laps                       Gait Training              x10'                                                   Modalities             MHP             CP x10' 10' 10' x10' x10' 10'       US/Stim

## 2021-01-13 ENCOUNTER — OFFICE VISIT (OUTPATIENT)
Dept: PHYSICAL THERAPY | Facility: CLINIC | Age: 61
End: 2021-01-13
Payer: COMMERCIAL

## 2021-01-13 DIAGNOSIS — M25.562 ACUTE PAIN OF LEFT KNEE: ICD-10-CM

## 2021-01-13 DIAGNOSIS — Z47.1 AFTERCARE FOLLOWING LEFT KNEE JOINT REPLACEMENT SURGERY: Primary | ICD-10-CM

## 2021-01-13 DIAGNOSIS — Z96.652 AFTERCARE FOLLOWING LEFT KNEE JOINT REPLACEMENT SURGERY: Primary | ICD-10-CM

## 2021-01-13 PROCEDURE — 97530 THERAPEUTIC ACTIVITIES: CPT

## 2021-01-13 PROCEDURE — 97110 THERAPEUTIC EXERCISES: CPT

## 2021-01-13 PROCEDURE — 97140 MANUAL THERAPY 1/> REGIONS: CPT

## 2021-01-13 NOTE — PROGRESS NOTES
Daily Note     Today's date: 2021  Patient name: Hector Carbajal  : 1960  MRN: [id number]  Referring provider: Shahnaz Sheets MD  Dx:   Encounter Diagnosis     ICD-10-CM    1  Aftercare following left knee joint replacement surgery  Z47 1     Z96 652    2  Acute pain of left knee  M25 562                   Subjective: pt reports he was sore but is doing well overall  Reports no pain  Objective: See treatment diary below      Assessment: Tolerated treatment well  Patient demonstrated fatigue post treatment  Pt cont to make gains with program  Pt with near 120 degrees of arom knee flx and -5 degrees of knee ext  Pt [place] program well with cont improvement  Plan: Continue per plan of care          Precautions: S/P L Knee TKA 20    Daily Treatment Diary    HEP: Handout provided and discussed      Manuals 20       ART    x15' x15'        PROM/Stretch 15' 15' 15'   15'       IASTM             STM/Triggerpoint             JM                          Neuro Re-Ed             Standing  Roll calf stretch 6x20'' 20" 620'' 6x20'' 6x20'' 20"        SL Ecc HR             Prone Hangs with self over pressure flex and ext 15/15" holds 15/10" holds 15/10" holds 15/10'' holds 15x15'' holds 15/15" holds       SLB AE 8/15"     8x15'' 8/15"                                                           Ther Ex             Long sit QS back and forth  3/10 3/10 3x10 3x10        HS into QS 4x10 SLR 3/10 3/10 into SLR 3x10 4x10 into SLR 4/10 SLR       HR/TR  3/10 3/10 4x10 4x10        TB TKE 4x10 L5 3/10 L3 3/10 L3 3x10 L5 4x10 L5 4/10 L5       TB Heel to Toes 4/10 L5 3/10 L3 3/10 L3 4x10 L5 4x10 L5 4/10 L5       Sit to stand 3/10  2/10 2x10 3x10 3/10       TRX Squat ROM 3/10 5"   2x10 5'' 3x10 5'' 3/10 5"                    Bridge with Add squeeze             SL Bridge             Clam shells             SL Sit to Stand             BOSU SLB             Upside down BOSU SL squat holds             TB Lat Walks  Next session           Step ups/downs 8" 3/10 ea 6" 3/10 6" 3/10 8" 3/10; 6" 3x10 8'' 3x10 ea 8" 3/10 ea                                 Ther Activity             TM             Bike 10' 10' 10' 10' x10' 10'       NuStep             Forward/backward heel to toe walk  10 laps 10 laps                       Gait Training                                                                 Modalities             MHP             CP x10' 10' 10' x10' x10' 10'       US/Stim

## 2021-01-14 ENCOUNTER — APPOINTMENT (OUTPATIENT)
Dept: PHYSICAL THERAPY | Facility: CLINIC | Age: 61
End: 2021-01-14
Payer: COMMERCIAL

## 2021-01-19 ENCOUNTER — OFFICE VISIT (OUTPATIENT)
Dept: PHYSICAL THERAPY | Facility: CLINIC | Age: 61
End: 2021-01-19
Payer: COMMERCIAL

## 2021-01-19 DIAGNOSIS — Z96.652 AFTERCARE FOLLOWING LEFT KNEE JOINT REPLACEMENT SURGERY: Primary | ICD-10-CM

## 2021-01-19 DIAGNOSIS — M25.562 ACUTE PAIN OF LEFT KNEE: ICD-10-CM

## 2021-01-19 DIAGNOSIS — Z47.1 AFTERCARE FOLLOWING LEFT KNEE JOINT REPLACEMENT SURGERY: Primary | ICD-10-CM

## 2021-01-19 PROCEDURE — 97110 THERAPEUTIC EXERCISES: CPT

## 2021-01-19 PROCEDURE — 97140 MANUAL THERAPY 1/> REGIONS: CPT

## 2021-01-19 PROCEDURE — 97530 THERAPEUTIC ACTIVITIES: CPT

## 2021-01-19 NOTE — PROGRESS NOTES
Daily Note     Today's date: 2021  Patient name: Noel Gould  : 1960  MRN: [id number]  Referring provider: Da Land MD  Dx:   Encounter Diagnosis     ICD-10-CM    1  Aftercare following left knee joint replacement surgery  Z47 1     Z96 652    2  Acute pain of left knee  M25 562                   Subjective: Pt reports he is doing well and will cont with PT after this week 1x per week until seeing MD  Very pleased with progress  Objective: See treatment diary below  120 degrees L knee AROM  125 degrees L knee PROM  Assessment: Tolerated treatment well  Patient demonstrated fatigue post treatment  Pt cont to make gains with program  Pt [place] well with min pain  Pt cont to make gains with program with ROM and strength  Plan: Continue per plan of care          Precautions: S/P L Knee TKA 20    Daily Treatment Diary    HEP: Handout provided and discussed      Manuals 20       ART    x15' x15'        PROM/Stretch 15' 15' 15'   15'       IASTM             STM/Triggerpoint             JM                          Neuro Re-Ed             Standing  Roll calf stretch 6x20'' 20" 6/20'' 6x20'' 6x20'' 20"        SL Ecc HR             Prone Hangs with self over pressure flex and ext 15/15" holds 15/10" holds 15/10" holds 15/10'' holds 15x15'' holds 15/15" holds       SLB AE 8/15"  8/15" H   8x15'' 8/15"                                                           Ther Ex             Long sit QS back and forth   3/10 3x10 3x10        HS into QS 4x10 SLR 4/10 SLR 3/10 into SLR 3x10 4x10 into SLR 4/10 SLR       HR/TR  3/10 3/10 4x10 4x10        TB TKE 4x10 L5 3/10 L5 3/10 L3 3x10 L5 4x10 L5 4/10 L5       TB Heel to Toes 4/10 L5 3/10 L5 3/10 L3 4x10 L5 4x10 L5 4/10 L5       Sit to stand 3/10 3/10 2/10 2x10 3x10 3/10       TRX Squat ROM 3/10 5" 3/10 5"   2x10 5'' 3x10 5'' 3/10 5"                    Bridge with Add squeeze             SL Bridge Clam shells             SL Sit to Stand             BOSU SLB             Upside down BOSU SL squat holds             TB Lat Walks  Next session           Step ups/downs 8" 3/10 ea 8" 3/10 6" 3/10 8" 3/10; 6" 3x10 8'' 3x10 ea 8" 3/10 ea                                 Ther Activity             TM             Bike 10' 10' 10' 10' x10' 10'       NuStep             Forward/backward heel to toe walk   10 laps                       Gait Training                                                                 Modalities             MHP             CP x10' 10' 10' x10' x10' 10'       US/Stim

## 2021-01-21 ENCOUNTER — OFFICE VISIT (OUTPATIENT)
Dept: PHYSICAL THERAPY | Facility: CLINIC | Age: 61
End: 2021-01-21
Payer: COMMERCIAL

## 2021-01-21 DIAGNOSIS — M25.562 ACUTE PAIN OF LEFT KNEE: ICD-10-CM

## 2021-01-21 DIAGNOSIS — Z96.652 AFTERCARE FOLLOWING LEFT KNEE JOINT REPLACEMENT SURGERY: Primary | ICD-10-CM

## 2021-01-21 DIAGNOSIS — Z47.1 AFTERCARE FOLLOWING LEFT KNEE JOINT REPLACEMENT SURGERY: Primary | ICD-10-CM

## 2021-01-21 PROCEDURE — 97110 THERAPEUTIC EXERCISES: CPT

## 2021-01-21 PROCEDURE — 97530 THERAPEUTIC ACTIVITIES: CPT

## 2021-01-21 PROCEDURE — 97112 NEUROMUSCULAR REEDUCATION: CPT

## 2021-01-21 NOTE — PROGRESS NOTES
Daily Note     Today's date: 2021  Patient name: Yury Ramires  : 1960  MRN: [id number]  Referring provider: Peter Franks MD  Dx:   Encounter Diagnosis     ICD-10-CM    1  Aftercare following left knee joint replacement surgery  Z47 1     Z96 652    2  Acute pain of left knee  M25 562                   Subjective: Pt reports he is doing well and cont to improve  Reports improved sleeping  Objective: See treatment diary below      Assessment: Tolerated treatment well  Patient exhibited good technique with therapeutic exercises  Pt cont to make gains with program  Pt cont to demonstrate overall improvement in strength and ROM  Pt demonstrating near full AROM  Plan: Continue per plan of care          Precautions: S/P L Knee TKA 20    Daily Treatment Diary    HEP: Handout provided and discussed      Manuals 21       ART    x15' x15'        PROM/Stretch 15' 15' 15'   15'       IASTM             STM/Triggerpoint             JM                          Neuro Re-Ed             Standing  Roll calf stretch 6x20'' 20" 20'' 6x20'' 6x20'' "        SL Ecc HR             Prone Hangs with self over pressure flex and ext 15/15" holds 15/10" holds 15/10" holds 15/10'' holds 15x15'' holds 15/15" holds       SLB AE 8/15"  8/15" H SLB steamboats 2/10  8x15'' 8/15"                                                           Ther Ex             Long sit QS back and forth    3x10 3x10        HS into QS 4x10 SLR 4/10 SLR 4/10 SLR into SLR 3x10 4x10 into SLR 4/10 SLR       HR/TR  3/10  4x10 4x10        TB TKE 4x10 L5 3/10 L5 3/10 L5 3x10 L5 4x10 L5 4/10 L5       TB Heel to Toes 4/10 L5 3/10 L5 3/10 L5 4x10 L5 4x10 L5 4/10 L5       Sit to stand 3/10 3/10 3/10 2x10 3x10 3/10       TRX Squat ROM 3/10 5" 3/10 5"  3/10 5" 2x10 5'' 3x10 5'' 3/10 5"                    Bridge with Add squeeze   3/10          SL Bridge             Clam shells             SL Sit to Stand BOSU SLB             Upside down BOSU SL squat holds             TB Lat Walks  Next session           Step ups/downs 8" 3/10 ea 8" 3/10 8" 3/10 8" 3/10; 6" 3x10 8'' 3x10 ea 8" 3/10 ea                                 Ther Activity             TM             Bike 10' 10' 10' 10' x10' 10'       NuStep             Forward/backward heel to toe walk                          Gait Training                                                                 Modalities             MHP             CP x10' 10' 10' x10' x10' 10'       US/Stim

## 2021-01-26 ENCOUNTER — APPOINTMENT (OUTPATIENT)
Dept: PHYSICAL THERAPY | Facility: CLINIC | Age: 61
End: 2021-01-26
Payer: COMMERCIAL

## 2021-01-27 ENCOUNTER — OFFICE VISIT (OUTPATIENT)
Dept: PHYSICAL THERAPY | Facility: CLINIC | Age: 61
End: 2021-01-27
Payer: COMMERCIAL

## 2021-01-27 DIAGNOSIS — M25.562 ACUTE PAIN OF LEFT KNEE: ICD-10-CM

## 2021-01-27 DIAGNOSIS — Z96.652 AFTERCARE FOLLOWING LEFT KNEE JOINT REPLACEMENT SURGERY: Primary | ICD-10-CM

## 2021-01-27 DIAGNOSIS — Z47.1 AFTERCARE FOLLOWING LEFT KNEE JOINT REPLACEMENT SURGERY: Primary | ICD-10-CM

## 2021-01-27 PROCEDURE — 97112 NEUROMUSCULAR REEDUCATION: CPT

## 2021-01-27 PROCEDURE — 97110 THERAPEUTIC EXERCISES: CPT

## 2021-01-27 PROCEDURE — 97530 THERAPEUTIC ACTIVITIES: CPT

## 2021-01-27 NOTE — PROGRESS NOTES
Daily Note     Today's date: 2021  Patient name: Iman Da Silva  : 1960  MRN: [id number]  Referring provider: Gabriela Underwood MD  Dx:   Encounter Diagnosis     ICD-10-CM    1  Aftercare following left knee joint replacement surgery  Z47 1     Z96 652    2  Acute pain of left knee  M25 562                   Subjective: Pt reports he is doing well and cont to make gains  Reports able to do work tasks well  Objective: See treatment diary below      Assessment: Tolerated treatment well  Patient exhibited good technique with therapeutic exercises  Pt cont to demonstrate near full A/PROM into knee flx/ext  Pt [place] well with min pain  Pt cont to progress toward goals  Plan: Continue per plan of care          Precautions: S/P L Knee TKA 20    Daily Treatment Diary    HEP: Handout provided and discussed      Manuals 21       ART     x15'        PROM/Stretch 15' 15' 15' 15'  15'       IASTM             STM/Triggerpoint             JM                          Neuro Re-Ed             Standing  Roll calf stretch 6x20'' 20" 620'' 6x20'' 6x20'' 20"        SL Ecc HR             Prone Hangs with self over pressure flex and ext 15/15" holds 15/10" holds 15/10" holds 15/10'' holds 15x15'' holds 15/15" holds       SLB AE 8/15"  8/15" H SLB steamboats 2/10 SLB steam boats 2/10 8x15'' 8/15"                                                           Ther Ex             Long sit QS back and forth     3x10        HS into QS 4x10 SLR 4/10 SLR 4/10 SLR into SLR 4x10 4x10 into SLR 4/10 SLR       HR/TR  3/10   4x10        TB TKE 4x10 L5 3/10 L5 3/10 L5 3x10 L5 4x10 L5 4/10 L5       TB Heel to Toes 4/10 L5 3/10 L5 3/10 L5 4x10 L5 4x10 L5 4/10 L5       Sit to stand 3/10 3/10 3/10 3x10 3x10 3/10       TRX Squat ROM 3/10 5" 3/10 5"  3/10 5" 2x10 5'' 3x10 5'' 3/10 5"                    Bridge with Add squeeze   3/10 3/10         SL Bridge             Clam shells             SL Sit to Stand             BOSU SLB             Upside down BOSU SL squat holds             TB Lat Walks  Next session           Step ups/downs 8" 3/10 ea 8" 3/10 8" 3/10 8" 3/10 8'' 3x10 ea 8" 3/10 ea       Leg Press    pl3 3/10                      Ther Activity             TM             Bike 10' 10' 10' 10' x10' 10'       NuStep             Forward/backward heel to toe walk                          Gait Training                                                                 Modalities             MHP             CP x10' 10' 10' x10' x10' 10'       US/Stim

## 2021-01-28 ENCOUNTER — APPOINTMENT (OUTPATIENT)
Dept: PHYSICAL THERAPY | Facility: CLINIC | Age: 61
End: 2021-01-28
Payer: COMMERCIAL

## 2021-02-02 ENCOUNTER — APPOINTMENT (OUTPATIENT)
Dept: PHYSICAL THERAPY | Facility: CLINIC | Age: 61
End: 2021-02-02
Payer: COMMERCIAL

## 2021-02-04 ENCOUNTER — OFFICE VISIT (OUTPATIENT)
Dept: PHYSICAL THERAPY | Facility: CLINIC | Age: 61
End: 2021-02-04
Payer: COMMERCIAL

## 2021-02-04 ENCOUNTER — APPOINTMENT (OUTPATIENT)
Dept: PHYSICAL THERAPY | Facility: CLINIC | Age: 61
End: 2021-02-04
Payer: COMMERCIAL

## 2021-02-04 DIAGNOSIS — Z96.652 AFTERCARE FOLLOWING LEFT KNEE JOINT REPLACEMENT SURGERY: Primary | ICD-10-CM

## 2021-02-04 DIAGNOSIS — M25.562 ACUTE PAIN OF LEFT KNEE: ICD-10-CM

## 2021-02-04 DIAGNOSIS — Z47.1 AFTERCARE FOLLOWING LEFT KNEE JOINT REPLACEMENT SURGERY: Primary | ICD-10-CM

## 2021-02-04 PROCEDURE — 97110 THERAPEUTIC EXERCISES: CPT

## 2021-02-04 PROCEDURE — 97140 MANUAL THERAPY 1/> REGIONS: CPT

## 2021-02-04 PROCEDURE — 97112 NEUROMUSCULAR REEDUCATION: CPT

## 2021-02-04 NOTE — PROGRESS NOTES
Daily Note     Today's date: 2021  Patient name: Franck Garcia  : 1960  MRN: [id number]  Referring provider: Katelyn Joel MD  Dx:   Encounter Diagnosis     ICD-10-CM    1  Aftercare following left knee joint replacement surgery  Z47 1     Z96 652    2  Acute pain of left knee  M25 562                   Subjective: Pt reports he is doing well  Cont to make gains with strength  Reports good [place] to shoveling  Objective: See treatment diary below      Assessment: Tolerated treatment well  Patient demonstrated fatigue post treatment  Pt able to progress program today with good [place]  Pt cont to demonstrate overall improved strength and ROM  Plan: Continue per plan of care          Precautions: S/P L Knee TKA 20    Daily Treatment Diary    HEP: Handout provided and discussed      Manuals 21       ART             PROM/Stretch 15' 15' 15' 15' 15' 15'       IASTM             STM/Triggerpoint             JM                          Neuro Re-Ed             Standing  Roll calf stretch 6x20'' 20" 20'' 6x20'' 6x20'' 20"        SL Ecc HR             Prone Hangs with self over pressure flex and ext 15/15" holds 15/10" holds 15/10" holds 15/10'' holds 15x15'' holds 15/15" holds       SLB AE 8/15"  8/15" H SLB steamboats 2/10 SLB steam boats 2/10 Steamboats 2/10 8/15"                                                           Ther Ex             Long sit QS back and forth             HS into QS 4x10 SLR 4/10 SLR 4/10 SLR into SLR 4x10 3/10 into SLR 2# 4/10 SLR       HR/TR  3/10           TB TKE 4x10 L5 3/10 L5 3/10 L5 3x10 L5 4x10 L5 4/10 L5       TB Heel to Toes 4/10 L5 3/10 L5 3/10 L5 4x10 L5 4x10 L5 4/10 L5       Sit to stand 3/10 3/10 3/10 3x10 3x10 3/10       TRX Squat ROM 3/10 5" 3/10 5"  3/10 5" 2x10 5'' 3x10 5'' 3/10 5"                    Bridge with Add squeeze   3/10 3/10         SL Bridge             Clam shells             SL Sit to Stand BOSU SLB             Upside down BOSU SL squat holds             TB Lat Walks  Next session           Step ups/downs 8" 3/10 ea 8" 3/10 8" 3/10 8" 3/10 8'' 3x10 ea 8" 3/10 ea       Leg Press    pl3 3/10         HS curl machine     6pl 3/10        Ther Activity             TM             Bike 10' 10' 10' 10' x10' 10'       NuStep             Forward/backward heel to toe walk                          Gait Training                                                                 Modalities             MHP             CP x10' 10' 10' x10' x10' 10'       US/Stim

## 2021-02-09 ENCOUNTER — OFFICE VISIT (OUTPATIENT)
Dept: PHYSICAL THERAPY | Facility: CLINIC | Age: 61
End: 2021-02-09
Payer: COMMERCIAL

## 2021-02-09 DIAGNOSIS — M25.562 ACUTE PAIN OF LEFT KNEE: ICD-10-CM

## 2021-02-09 DIAGNOSIS — Z96.652 AFTERCARE FOLLOWING LEFT KNEE JOINT REPLACEMENT SURGERY: Primary | ICD-10-CM

## 2021-02-09 DIAGNOSIS — Z47.1 AFTERCARE FOLLOWING LEFT KNEE JOINT REPLACEMENT SURGERY: Primary | ICD-10-CM

## 2021-02-09 PROCEDURE — 97530 THERAPEUTIC ACTIVITIES: CPT | Performed by: PHYSICAL THERAPIST

## 2021-02-09 PROCEDURE — 97140 MANUAL THERAPY 1/> REGIONS: CPT | Performed by: PHYSICAL THERAPIST

## 2021-02-09 PROCEDURE — 97112 NEUROMUSCULAR REEDUCATION: CPT | Performed by: PHYSICAL THERAPIST

## 2021-02-09 PROCEDURE — 97110 THERAPEUTIC EXERCISES: CPT | Performed by: PHYSICAL THERAPIST

## 2021-02-09 NOTE — PROGRESS NOTES
PT Discharge    Today's date: 2021  Patient name: Hasmukh Geronimo  : 1960  MRN: [id number]  Referring provider: Lowell Thomas MD  Dx:   Encounter Diagnosis     ICD-10-CM    1  Aftercare following left knee joint replacement surgery  Z47 1     Z96 652    2  Acute pain of left knee  M25 562                     Goals  STGs: To be complete within 4-6 weeks (met)  - Decrease pain to < 2/10 at worst  - Increase AROM to WNL  - Increase strength to > 4+/5  - Improve gait to WNL for distances < 6 blocks    LTGs: To be complete within 10 weeks (met)  - Able to walk for any extended amount of time/distance without pain or limitation for increased safety and functional capacity with ADLs and work-related duty  - Able to repetitively squat without pain or limitation for increased safety and functional capacity with ADLs and work-related duty  - Able to repetitively ascend/descend a full flight of stairs without pain or limitation for increased safety and functional capacity with ADLs and work-related duty  - Able to repetitively complete transfers without pain or limitation for increased safety and functional capacity with ADLs and work-related duty  - Able to kneel for any extended amount of time without pain or limitation for increased safety and functional capacity with ADLs and work-related duty       Pt will be D/C from physical therapy after today's session, as he has achieved all personal and functional goals  Pt has a good understanding of HEP and has been instructed to reach out with any questions/concerns/setbacks  Subjective Evaluation    Pain  Current pain ratin  At best pain ratin  At worst pain ratin  Location: L Knee         Pt reports he feels ready to safely D/C to HEP after today's session as he has achieved all personal functional goals  Pt reports he has a good understanding of HEP and will reach out with any questions/concerns/setbacks          Objective Pain level ranges 0-1/10  AROM: L Knee AROM 0-125 degrees; R Knee 0-135 degrees  Strength: L Quad and HS 5/5; R Quad and HS 5/5  Gait: WNL  Swelling: Mild (will continue to monitor)  Incision: Clean and healing well  FOTO: 100; GOAL: 76  Able to walk without pain and limitation  Able to squat without pain and limitation  Able complete transfers without pain and limitation  Able to ascend/descend stairs without pain and limitation  Able to kneel without pain and limitation             Precautions: S/P L Knee TKA 12/1/20    Daily Treatment Diary    HEP: Handout provided and discussed      Manuals 1/13/21 1/19/21 1/21/21 1/27/21 2/4/20 2/9/21       ART             PROM/Stretch 15' 15' 15' 15' 15' 15'       IASTM             STM/Triggerpoint             JM                          Neuro Re-Ed             Standing 1/2 Roll calf stretch 6x20'' 6/20" 6/20'' 6x20'' 6x20'' 6/20"        SL Ecc HR             Prone Hangs with self over pressure flex and ext 15/15" holds 15/10" holds 15/10" holds 15/10'' holds 15x15'' holds 15/15" holds       SLB AE 8/15"  8/15" H SLB steamboats 2/10 SLB steam boats 2/10 Steamboats 2/10 8/15"                                                           Ther Ex             Long sit QS back and forth             HS into QS 4x10 SLR 4/10 SLR 4/10 SLR into SLR 4x10 3/10 into SLR 2# 4/10 SLR       HR/TR  3/10           TB TKE 4x10 L5 3/10 L5 3/10 L5 3x10 L5 4x10 L5 4/10 L5       TB Heel to Toes 4/10 L5 3/10 L5 3/10 L5 4x10 L5 4x10 L5 4/10 L5       Sit to stand 3/10 3/10 3/10 3x10 3x10 3/10       TRX Squat ROM 3/10 5" 3/10 5"  3/10 5" 2x10 5'' 3x10 5'' 3/10 5"                    Bridge with Add squeeze   3/10 3/10  3x10       SL Bridge             Clam shells             SL Sit to Stand             BOSU SLB             Upside down BOSU SL squat holds             TB Lat Walks  Next session           Step ups/downs 8" 3/10 ea 8" 3/10 8" 3/10 8" 3/10 8'' 3x10 ea 8" 3/10 ea       Leg Press    pl3 3/10         HS curl machine 6pl 3/10 6pl 3x10       Ther Activity             TM             Bike 10' 10' 10' 10' x10' 10'       NuStep             Forward/backward heel to toe walk                          Gait Training                                                                 Modalities             MHP             CP x10' 10' 10' x10' x10' 10'       US/Stim

## 2021-02-09 NOTE — PROGRESS NOTES
PT Re-Evaluation    Today's date: 2021  Patient name: May Ward  : 1960  MRN: [id number]  Referring provider: Aniyah Adorno MD  Dx:   Encounter Diagnosis     ICD-10-CM    1  Aftercare following left knee joint replacement surgery  Z47 1     Z96 652    2  Acute pain of left knee  M25 562                     Goals  STGs: To be complete within 4-6 weeks (met)  - Decrease pain to < 2/10 at worst  - Increase AROM to WNL  - Increase strength to > 4+/5  - Improve gait to WNL for distances < 6 blocks    LTGs: To be complete within 10 weeks (met)  - Able to walk for any extended amount of time/distance without pain or limitation for increased safety and functional capacity with ADLs and work-related duty  - Able to repetitively squat without pain or limitation for increased safety and functional capacity with ADLs and work-related duty  - Able to repetitively ascend/descend a full flight of stairs without pain or limitation for increased safety and functional capacity with ADLs and work-related duty  - Able to repetitively complete transfers without pain or limitation for increased safety and functional capacity with ADLs and work-related duty  - Able to kneel for any extended amount of time without pain or limitation for increased safety and functional capacity with ADLs and work-related duty       Pt will be reduced to 1x/week until MD visit in a couple weeks  Doing great  Goals achieved  Still sees benefit from attending physical therapy at this time  Subjective Evaluation    Pain  Current pain ratin  At best pain ratin  At worst pain ratin  Location: L Knee         Pt reports he would feel most comfortable with continuing atleast 1x/week until MD follow up for safety  Reports he is doing great  No issues to note          Objective Pain level ranges 0-1/10  AROM: L Knee AROM 0-125 degrees; R Knee 0-135 degrees  Strength: L Quad and HS 5/5; R Quad and HS 5/5  Gait: WNL  Swelling: Mild (will continue to monitor)  Incision: Clean and healing well  FOTO: N/A; GOAL: 76  Able to walk without pain and limitation  Able to squat without pain and limitation  Able complete transfers without pain and limitation  Able to ascend/descend stairs without pain and limitation  Able to kneel without pain and limitation

## 2021-02-11 ENCOUNTER — APPOINTMENT (OUTPATIENT)
Dept: PHYSICAL THERAPY | Facility: CLINIC | Age: 61
End: 2021-02-11
Payer: COMMERCIAL

## 2021-02-16 ENCOUNTER — APPOINTMENT (OUTPATIENT)
Dept: PHYSICAL THERAPY | Facility: CLINIC | Age: 61
End: 2021-02-16
Payer: COMMERCIAL

## 2021-02-18 ENCOUNTER — APPOINTMENT (OUTPATIENT)
Dept: PHYSICAL THERAPY | Facility: CLINIC | Age: 61
End: 2021-02-18
Payer: COMMERCIAL

## 2021-02-23 ENCOUNTER — APPOINTMENT (OUTPATIENT)
Dept: PHYSICAL THERAPY | Facility: CLINIC | Age: 61
End: 2021-02-23
Payer: COMMERCIAL

## 2021-02-25 ENCOUNTER — APPOINTMENT (OUTPATIENT)
Dept: PHYSICAL THERAPY | Facility: CLINIC | Age: 61
End: 2021-02-25
Payer: COMMERCIAL

## 2021-03-06 NOTE — TELEPHONE ENCOUNTER
Please advise, pt requesting refill of Tizanidine 4 mg TID, pt uses [facility] and has enough for 8 more days  Opt out

## 2021-07-14 DIAGNOSIS — I10 ESSENTIAL HYPERTENSION: ICD-10-CM

## 2021-07-14 RX ORDER — LISINOPRIL 5 MG/1
5 TABLET ORAL DAILY
Qty: 90 TABLET | Refills: 1 | OUTPATIENT
Start: 2021-07-14

## 2021-07-14 NOTE — TELEPHONE ENCOUNTER
LEFT PATIENT A V/M ASKING TO PLEASE CALL US BACK REGARDING HIS AUGUST 11TH APPT  IT WAS FOR 11:20 BUT THE PROVIDER IS NEEDING HIM TO BE A DIFFERENT TIME  HE IS NOW AT 10 A  M  IF HE CANNOT TAKE THIS TIME, HE'LL HAVE TO BE RESCHEDULED  THANK YOU

## 2021-09-22 DIAGNOSIS — I10 ESSENTIAL HYPERTENSION: ICD-10-CM

## 2021-09-22 RX ORDER — AMLODIPINE BESYLATE 10 MG/1
10 TABLET ORAL DAILY
Qty: 90 TABLET | Refills: 3 | OUTPATIENT
Start: 2021-09-22

## 2021-10-18 ENCOUNTER — APPOINTMENT (RX ONLY)
Dept: URBAN - NONMETROPOLITAN AREA CLINIC 4 | Facility: CLINIC | Age: 61
Setting detail: DERMATOLOGY
End: 2021-10-18

## 2021-10-18 DIAGNOSIS — L57.0 ACTINIC KERATOSIS: ICD-10-CM

## 2021-10-18 PROCEDURE — 17003 DESTRUCT PREMALG LES 2-14: CPT

## 2021-10-18 PROCEDURE — 17000 DESTRUCT PREMALG LESION: CPT

## 2021-10-18 PROCEDURE — ? LIQUID NITROGEN

## 2021-10-18 ASSESSMENT — LOCATION SIMPLE DESCRIPTION DERM
LOCATION SIMPLE: LEFT EAR
LOCATION SIMPLE: LEFT ANTERIOR NECK
LOCATION SIMPLE: RIGHT CHEEK

## 2021-10-18 ASSESSMENT — LOCATION ZONE DERM
LOCATION ZONE: EAR
LOCATION ZONE: NECK
LOCATION ZONE: FACE

## 2021-10-18 ASSESSMENT — LOCATION DETAILED DESCRIPTION DERM
LOCATION DETAILED: RIGHT CENTRAL MANDIBULAR CHEEK
LOCATION DETAILED: RIGHT MID PREAURICULAR CHEEK
LOCATION DETAILED: LEFT CLAVICULAR NECK
LOCATION DETAILED: LEFT SUPERIOR HELIX

## 2021-10-18 NOTE — PROCEDURE: LIQUID NITROGEN
Show Aperture Variable?: Yes
Post-Care Instructions: I reviewed with the patient in detail post-care instructions. Patient is to wear sunprotection, and avoid picking at any of the treated lesions. Pt may apply Vaseline to crusted or scabbing areas.
Render Note In Bullet Format When Appropriate: No
Number Of Freeze-Thaw Cycles: 1 freeze-thaw cycle
Consent: The patient's consent was obtained including but not limited to risks of crusting, scabbing, blistering, scarring, darker or lighter pigmentary change, recurrence, incomplete removal and infection.
Detail Level: Zone

## 2021-10-30 ENCOUNTER — APPOINTMENT (OUTPATIENT)
Dept: LAB | Facility: HOSPITAL | Age: 61
End: 2021-10-30
Attending: FAMILY MEDICINE
Payer: COMMERCIAL

## 2021-10-30 DIAGNOSIS — Z13.220 SCREENING FOR LIPOID DISORDERS: ICD-10-CM

## 2021-10-30 DIAGNOSIS — Z12.5 SPECIAL SCREENING FOR MALIGNANT NEOPLASM OF PROSTATE: ICD-10-CM

## 2021-10-30 DIAGNOSIS — I10 HYPERTENSION, UNSPECIFIED TYPE: ICD-10-CM

## 2021-10-30 LAB
ALBUMIN SERPL BCP-MCNC: 3.9 G/DL (ref 3.5–5)
ALP SERPL-CCNC: 98 U/L (ref 46–116)
ALT SERPL W P-5'-P-CCNC: 34 U/L (ref 12–78)
ANION GAP SERPL CALCULATED.3IONS-SCNC: 6 MMOL/L (ref 4–13)
AST SERPL W P-5'-P-CCNC: 16 U/L (ref 5–45)
BILIRUB SERPL-MCNC: 1.42 MG/DL (ref 0.2–1)
BUN SERPL-MCNC: 12 MG/DL (ref 5–25)
CALCIUM SERPL-MCNC: 8.8 MG/DL (ref 8.3–10.1)
CHLORIDE SERPL-SCNC: 106 MMOL/L (ref 100–108)
CHOLEST SERPL-MCNC: 240 MG/DL (ref 50–200)
CO2 SERPL-SCNC: 30 MMOL/L (ref 21–32)
CREAT SERPL-MCNC: 0.96 MG/DL (ref 0.6–1.3)
ERYTHROCYTE [DISTWIDTH] IN BLOOD BY AUTOMATED COUNT: 13 % (ref 11.6–15.1)
GFR SERPL CREATININE-BSD FRML MDRD: 84 ML/MIN/1.73SQ M
GLUCOSE P FAST SERPL-MCNC: 95 MG/DL (ref 65–99)
HCT VFR BLD AUTO: 47.6 % (ref 36.5–49.3)
HDLC SERPL-MCNC: 58 MG/DL
HGB BLD-MCNC: 15.9 G/DL (ref 12–17)
LDLC SERPL CALC-MCNC: 147 MG/DL (ref 0–100)
MCH RBC QN AUTO: 31.2 PG (ref 26.8–34.3)
MCHC RBC AUTO-ENTMCNC: 33.4 G/DL (ref 31.4–37.4)
MCV RBC AUTO: 93 FL (ref 82–98)
NONHDLC SERPL-MCNC: 182 MG/DL
PLATELET # BLD AUTO: 231 THOUSANDS/UL (ref 149–390)
PMV BLD AUTO: 10.1 FL (ref 8.9–12.7)
POTASSIUM SERPL-SCNC: 4.5 MMOL/L (ref 3.5–5.3)
PROT SERPL-MCNC: 7.2 G/DL (ref 6.4–8.2)
PSA SERPL-MCNC: 1.6 NG/ML (ref 0–4)
RBC # BLD AUTO: 5.1 MILLION/UL (ref 3.88–5.62)
SODIUM SERPL-SCNC: 142 MMOL/L (ref 136–145)
TRIGL SERPL-MCNC: 175 MG/DL
WBC # BLD AUTO: 6.25 THOUSAND/UL (ref 4.31–10.16)

## 2021-10-30 PROCEDURE — 80061 LIPID PANEL: CPT

## 2021-10-30 PROCEDURE — 36415 COLL VENOUS BLD VENIPUNCTURE: CPT

## 2021-10-30 PROCEDURE — 85027 COMPLETE CBC AUTOMATED: CPT

## 2021-10-30 PROCEDURE — 80053 COMPREHEN METABOLIC PANEL: CPT

## 2021-10-30 PROCEDURE — G0103 PSA SCREENING: HCPCS

## 2021-12-13 ENCOUNTER — APPOINTMENT (RX ONLY)
Dept: URBAN - NONMETROPOLITAN AREA CLINIC 4 | Facility: CLINIC | Age: 61
Setting detail: DERMATOLOGY
End: 2021-12-13

## 2021-12-13 DIAGNOSIS — L82.0 INFLAMED SEBORRHEIC KERATOSIS: ICD-10-CM

## 2021-12-13 DIAGNOSIS — L57.0 ACTINIC KERATOSIS: ICD-10-CM

## 2021-12-13 PROCEDURE — ? TREATMENT REGIMEN

## 2021-12-13 PROCEDURE — 17003 DESTRUCT PREMALG LES 2-14: CPT | Mod: 59

## 2021-12-13 PROCEDURE — 17000 DESTRUCT PREMALG LESION: CPT | Mod: 59

## 2021-12-13 PROCEDURE — ? COUNSELING

## 2021-12-13 PROCEDURE — ? LIQUID NITROGEN

## 2021-12-13 PROCEDURE — 17110 DESTRUCTION B9 LES UP TO 14: CPT

## 2021-12-13 ASSESSMENT — LOCATION SIMPLE DESCRIPTION DERM
LOCATION SIMPLE: CHIN
LOCATION SIMPLE: LEFT CHEEK
LOCATION SIMPLE: RIGHT FOREHEAD
LOCATION SIMPLE: LEFT FOREHEAD

## 2021-12-13 ASSESSMENT — LOCATION DETAILED DESCRIPTION DERM
LOCATION DETAILED: RIGHT FOREHEAD
LOCATION DETAILED: RIGHT SUPERIOR MEDIAL FOREHEAD
LOCATION DETAILED: LEFT FOREHEAD
LOCATION DETAILED: RIGHT INFERIOR FOREHEAD
LOCATION DETAILED: RIGHT INFERIOR LATERAL FOREHEAD
LOCATION DETAILED: LEFT SUPERIOR LATERAL BUCCAL CHEEK
LOCATION DETAILED: RIGHT SUPERIOR FOREHEAD
LOCATION DETAILED: LEFT INFERIOR LATERAL FOREHEAD
LOCATION DETAILED: LEFT CHIN

## 2021-12-13 ASSESSMENT — LOCATION ZONE DERM: LOCATION ZONE: FACE

## 2021-12-13 NOTE — PROCEDURE: LIQUID NITROGEN
Show Aperture Variable?: Yes
Detail Level: Zone
Consent: The patient's consent was obtained including but not limited to risks of crusting, scabbing, blistering, scarring, darker or lighter pigmentary change, recurrence, incomplete removal and infection.
Post-Care Instructions: I reviewed with the patient in detail post-care instructions. Patient is to wear sunprotection, and avoid picking at any of the treated lesions. Pt may apply Vaseline to crusted or scabbing areas.
Number Of Freeze-Thaw Cycles: 1 freeze-thaw cycle
Render Note In Bullet Format When Appropriate: No
Medical Necessity Clause: This procedure was medically necessary because the lesions that were treated were:
Medical Necessity Information: It is in your best interest to select a reason for this procedure from the list below. All of these items fulfill various CMS LCD requirements except the new and changing color options.

## 2022-04-22 ENCOUNTER — APPOINTMENT (OUTPATIENT)
Dept: LAB | Facility: MEDICAL CENTER | Age: 62
End: 2022-04-22
Payer: COMMERCIAL

## 2022-04-22 DIAGNOSIS — E78.5 HYPERLIPIDEMIA, UNSPECIFIED HYPERLIPIDEMIA TYPE: ICD-10-CM

## 2022-04-22 DIAGNOSIS — E78.00 HYPERCHOLESTEROLEMIA: ICD-10-CM

## 2022-04-22 LAB
ALBUMIN SERPL BCP-MCNC: 4.1 G/DL (ref 3.5–5)
ALP SERPL-CCNC: 88 U/L (ref 46–116)
ALT SERPL W P-5'-P-CCNC: 44 U/L (ref 12–78)
ANION GAP SERPL CALCULATED.3IONS-SCNC: 3 MMOL/L (ref 4–13)
AST SERPL W P-5'-P-CCNC: 27 U/L (ref 5–45)
BILIRUB SERPL-MCNC: 1.58 MG/DL (ref 0.2–1)
BUN SERPL-MCNC: 11 MG/DL (ref 5–25)
CALCIUM SERPL-MCNC: 8.9 MG/DL (ref 8.3–10.1)
CHLORIDE SERPL-SCNC: 108 MMOL/L (ref 100–108)
CHOLEST SERPL-MCNC: 241 MG/DL
CO2 SERPL-SCNC: 27 MMOL/L (ref 21–32)
CREAT SERPL-MCNC: 1.06 MG/DL (ref 0.6–1.3)
ERYTHROCYTE [DISTWIDTH] IN BLOOD BY AUTOMATED COUNT: 13.2 % (ref 11.6–15.1)
EST. AVERAGE GLUCOSE BLD GHB EST-MCNC: 94 MG/DL
GFR SERPL CREATININE-BSD FRML MDRD: 73 ML/MIN/1.73SQ M
GLUCOSE P FAST SERPL-MCNC: 131 MG/DL (ref 65–99)
HBA1C MFR BLD: 4.9 %
HCT VFR BLD AUTO: 45.9 % (ref 36.5–49.3)
HDLC SERPL-MCNC: 48 MG/DL
HGB BLD-MCNC: 15.3 G/DL (ref 12–17)
LDLC SERPL CALC-MCNC: 164 MG/DL (ref 0–100)
MCH RBC QN AUTO: 31 PG (ref 26.8–34.3)
MCHC RBC AUTO-ENTMCNC: 33.3 G/DL (ref 31.4–37.4)
MCV RBC AUTO: 93 FL (ref 82–98)
NONHDLC SERPL-MCNC: 193 MG/DL
PLATELET # BLD AUTO: 242 THOUSANDS/UL (ref 149–390)
PMV BLD AUTO: 10.4 FL (ref 8.9–12.7)
POTASSIUM SERPL-SCNC: 4.1 MMOL/L (ref 3.5–5.3)
PROT SERPL-MCNC: 7.4 G/DL (ref 6.4–8.2)
RBC # BLD AUTO: 4.94 MILLION/UL (ref 3.88–5.62)
SODIUM SERPL-SCNC: 138 MMOL/L (ref 136–145)
TRIGL SERPL-MCNC: 147 MG/DL
WBC # BLD AUTO: 7.84 THOUSAND/UL (ref 4.31–10.16)

## 2022-04-22 PROCEDURE — 36415 COLL VENOUS BLD VENIPUNCTURE: CPT

## 2022-04-22 PROCEDURE — 83036 HEMOGLOBIN GLYCOSYLATED A1C: CPT

## 2022-04-22 PROCEDURE — 80061 LIPID PANEL: CPT

## 2022-04-22 PROCEDURE — 80053 COMPREHEN METABOLIC PANEL: CPT

## 2022-04-22 PROCEDURE — 85027 COMPLETE CBC AUTOMATED: CPT

## 2023-07-11 ENCOUNTER — EVALUATION (OUTPATIENT)
Dept: PHYSICAL THERAPY | Facility: CLINIC | Age: 63
End: 2023-07-11
Payer: MEDICARE

## 2023-07-11 DIAGNOSIS — M25.511 RIGHT SHOULDER PAIN, UNSPECIFIED CHRONICITY: Primary | ICD-10-CM

## 2023-07-11 DIAGNOSIS — M75.41 SHOULDER IMPINGEMENT SYNDROME, RIGHT: ICD-10-CM

## 2023-07-11 PROCEDURE — 97140 MANUAL THERAPY 1/> REGIONS: CPT | Performed by: PHYSICAL THERAPIST

## 2023-07-11 PROCEDURE — 97161 PT EVAL LOW COMPLEX 20 MIN: CPT | Performed by: PHYSICAL THERAPIST

## 2023-07-11 PROCEDURE — 97535 SELF CARE MNGMENT TRAINING: CPT | Performed by: PHYSICAL THERAPIST

## 2023-07-11 PROCEDURE — 97110 THERAPEUTIC EXERCISES: CPT | Performed by: PHYSICAL THERAPIST

## 2023-07-11 NOTE — PROGRESS NOTES
PT Evaluation     Today's date: 2023  Patient name: Loida Ignacio  : 1960  MRN: [id number]  Referring provider: Donis Schroeder, *  Dx:   Encounter Diagnosis     ICD-10-CM    1. Right shoulder pain, unspecified chronicity  M25.511       2. Shoulder impingement syndrome, right  M75.41                      Assessment  Understanding of Dx/Px/POC: good   Prognosis: good    Goals  STGs: To be complete within 4 weeks  - Decrease pain to < 2/10 at worst  - Increase AROM to WNL  - Increase strength to > 4+/5  - Improve postural awareness capacity to > 60min before deficit    LTGs: To be complete within 6 weeks  - Able to repetitively complete all overhead activity without pain or limitation for increased safety and functional capacity with ADLs and work-related duty  - Able to repetitively complete all reaching activity without pain or limitation for increased safety and functional capacity with ADLs and work-related duty  - Able to repetitively complete all pushing/pulling activity without pain or limitation for increased safety and functional capacity with ADLs and work-related duty  - Able to complete all lifting/carrying activity without pain or limitation for increased safety and functional capacity with ADLs and work-related duty    Plan  Planned therapy interventions: manual therapy, neuromuscular re-education, patient education, postural training, self care, therapeutic exercise, therapeutic activities and home exercise program  Frequency: 2x week  Duration in weeks: 6       Pt is a 70 y.o. male with R shoulder pain who presents with functional deficits including decreased capacity with overhead activity, pushing/pulling, lifting/carrying, and behind the back/cross body reaching activity.  Upon completion of today's initial evaluation, Vasiliy's sx remain consistent with R Shoulder subacromial impingement and biceps tendon/RTC tednonopathy secondary to postural dysfunction and improper functional movement mechanics. Patient will benefit from skilled physical therapy to address current deficits. Subjective Evaluation    Patient Goals  Patient goals for therapy: increased strength, decreased pain and increased motion    Pain  Current pain ratin  At best pain ratin  At worst pain ratin  Location: R Shoulder         Pt reports he has been dealing with R Shoulder pain that has continued to worsen to point where it is negatively impacting his overall safety and functional capacity with ADLs and work-related duty.         Objective Pain level ranges 2-8/10  AROM: R Shoulder Abd 155 degrees, Flexion 160 degrees, ER 70 degrees, IR 40 degrees; L Shoulder WNL  Strength: R Shoulder Abd 3+/5, Flex 3+/5, ER 4/5, IR 5/5  Postural Awareness: Poor (rounded shoulders, forward head)  Special Tests: (+) Hawkin's, (+) Empty Can, (+) O'lui's  FOTO: 44; GOAL: 66  Unable to complete overhead activity without pain and limitation  Unable to complete pushing/pulling activity without pain and limitation  Unable to complete lifting/carrying activity without pain and limitation  Unable to complete cross body/behind the back reaching activity without pain and limitation             Precautions: None at this time    Daily Treatment Diary    HEP: Handout provided and discussed      Manuals 23       ART x15'       PROM/Stretch        IASTM        STM/Triggerpoint        JM                Neuro Re-Ed        Scap retract 2x10       No $ AROM 2x10       No $ into Palm up Abd AROM 2x10                                       Ther Ex        TB No $ 2x10 L2       TB 70 degree ABD 2x10 2       SL ER, Abd  This session      Prone Ys, Ts  This session      TB Ys, Ts    This session    Prone retract with ER     This session                           Ther Activity                        Gait Training                        Modalities        MHP        CP x10'       US/Stim

## 2023-07-11 NOTE — LETTER
2023    [facility]  [address] [place] [address]    Patient: Loida Ignacio   YOB: 1960   Date of Visit: 2023     Encounter Diagnosis     ICD-10-CM    1. Right shoulder pain, unspecified chronicity  M25.511       2. Shoulder impingement syndrome, right  M75.41           Dear Dr. Ana Mckeon: Thank you for your recent referral of Loida Ignacio. Please review the attached evaluation summary from Vasiliy's recent visit. Please verify that you agree with the plan of care by signing the attached order. If you have any questions or concerns, please do not hesitate to call. I sincerely appreciate the opportunity to share in the care of one of your patients and hope to have another opportunity to work with you in the near future. Sincerely,    Cecelia Landin, PT, DPT, ATC, ART      Referring Provider:      I certify that I have read the below Plan of Care and certify the need for these services furnished under this plan of treatment while under my care. [facility]  [address] [place] [address]  Via Fax: [phone]          PT Evaluation     Today's date: 2023  Patient name: Loida Ignacio  : 1960  MRN: [id number]  Referring provider: Donis Schroeder, *  Dx:   Encounter Diagnosis     ICD-10-CM    1. Right shoulder pain, unspecified chronicity  M25.511       2.  Shoulder impingement syndrome, right  M75.41                      Assessment  Understanding of Dx/Px/POC: good   Prognosis: good    Goals  STGs: To be complete within 4 weeks  - Decrease pain to < 2/10 at worst  - Increase AROM to WNL  - Increase strength to > 4+/5  - Improve postural awareness capacity to > 60min before deficit    LTGs: To be complete within 6 weeks  - Able to repetitively complete all overhead activity without pain or limitation for increased safety and functional capacity with ADLs and work-related duty  - Able to repetitively complete all reaching activity without pain or limitation for increased safety and functional capacity with ADLs and work-related duty  - Able to repetitively complete all pushing/pulling activity without pain or limitation for increased safety and functional capacity with ADLs and work-related duty  - Able to complete all lifting/carrying activity without pain or limitation for increased safety and functional capacity with ADLs and work-related duty    Plan  Planned therapy interventions: manual therapy, neuromuscular re-education, patient education, postural training, self care, therapeutic exercise, therapeutic activities and home exercise program  Frequency: 2x week  Duration in weeks: 6       Pt is a 70 y.o. male with R shoulder pain who presents with functional deficits including decreased capacity with overhead activity, pushing/pulling, lifting/carrying, and behind the back/cross body reaching activity. Upon completion of today's initial evaluation, Vasiliy's sx remain consistent with R Shoulder subacromial impingement and biceps tendon/RTC tednonopathy secondary to postural dysfunction and improper functional movement mechanics. Patient will benefit from skilled physical therapy to address current deficits. Subjective Evaluation    Patient Goals  Patient goals for therapy: increased strength, decreased pain and increased motion    Pain  Current pain ratin  At best pain ratin  At worst pain ratin  Location: R Shoulder         Pt reports he has been dealing with R Shoulder pain that has continued to worsen to point where it is negatively impacting his overall safety and functional capacity with ADLs and work-related duty.         Objective Pain level ranges 2-8/10  AROM: R Shoulder Abd 155 degrees, Flexion 160 degrees, ER 70 degrees, IR 40 degrees; L Shoulder WNL  Strength: R Shoulder Abd 3+/5, Flex 3+/5, ER 4/5, IR 5/5  Postural Awareness: Poor (rounded shoulders, forward head)  Special Tests: (+) Hawkin's, (+) Empty Can, (+) O'lui's  FOTO: 40; GOAL: 66  Unable to complete overhead activity without pain and limitation  Unable to complete pushing/pulling activity without pain and limitation  Unable to complete lifting/carrying activity without pain and limitation  Unable to complete cross body/behind the back reaching activity without pain and limitation            Precautions: None at this time    Daily Treatment Diary    HEP: Handout provided and discussed      Manuals 7/11/23       ART x15'       PROM/Stretch        IASTM        STM/Triggerpoint        JM                Neuro Re-Ed        Scap retract 2x10       No $ AROM 2x10       No $ into Palm up Abd AROM 2x10                                       Ther Ex        TB No $ 2x10 L2       TB 70 degree ABD 2x10 2       SL ER, Abd  This session      Prone Ys, Ts  This session      TB Ys, Ts    This session    Prone retract with ER     This session                           Ther Activity                        Gait Training                        Modalities        MHP        CP x10'       US/Stim

## 2023-07-17 ENCOUNTER — OFFICE VISIT (OUTPATIENT)
Dept: PHYSICAL THERAPY | Facility: CLINIC | Age: 63
End: 2023-07-17
Payer: MEDICARE

## 2023-07-17 DIAGNOSIS — M75.41 SHOULDER IMPINGEMENT SYNDROME, RIGHT: ICD-10-CM

## 2023-07-17 DIAGNOSIS — M25.511 RIGHT SHOULDER PAIN, UNSPECIFIED CHRONICITY: Primary | ICD-10-CM

## 2023-07-17 PROCEDURE — 97140 MANUAL THERAPY 1/> REGIONS: CPT | Performed by: PHYSICAL THERAPIST

## 2023-07-17 PROCEDURE — 97110 THERAPEUTIC EXERCISES: CPT | Performed by: PHYSICAL THERAPIST

## 2023-07-17 PROCEDURE — 97112 NEUROMUSCULAR REEDUCATION: CPT | Performed by: PHYSICAL THERAPIST

## 2023-07-17 NOTE — PROGRESS NOTES
Daily Note     Today's date: 2023  Patient name: Ge Arriaga  : 1960  MRN: [id number]  Referring provider: Jessa Zavala, *  Dx:   Encounter Diagnosis     ICD-10-CM    1. Right shoulder pain, unspecified chronicity  M25.511       2. Shoulder impingement syndrome, right  M75.41                      Subjective: Pt reports HEP going well. No setbacks. So far not much change, but anxious to continue making progress. Objective: See treatment diary below      Assessment: Tolerated treatment well. Patient demonstrated fatigue post treatment, exhibited good technique with therapeutic exercises and would benefit from continued PT      Plan: Continue per plan of care. Progress treatment as tolerated.          Precautions: None at this time    Daily Treatment Diary    HEP: Handout provided and discussed      Manuals 23      ART x15' x15'      PROM/Stretch        IASTM        STM/Triggerpoint        JM                Neuro Re-Ed        Scap retract 2x10 2x10      No $ AROM 2x10 2x10      No $ into Palm up Abd AROM 2x10 2x10                                      Ther Ex        TB No $ 2x10 L2 3x10 L2      TB 70 degree ABD 2x10 L2 2x10 L2      SL ER, Abd  3x10 2#, 2#      Prone Ys, Ts  2x10 2#      TB Ys, Ts    This session    Prone retract with ER     This session                           Ther Activity                        Gait Training                        Modalities        MHP        CP x10' x10'      US/Stim

## 2023-07-19 ENCOUNTER — OFFICE VISIT (OUTPATIENT)
Dept: PHYSICAL THERAPY | Facility: CLINIC | Age: 63
End: 2023-07-19
Payer: MEDICARE

## 2023-07-19 DIAGNOSIS — M25.511 RIGHT SHOULDER PAIN, UNSPECIFIED CHRONICITY: Primary | ICD-10-CM

## 2023-07-19 DIAGNOSIS — M75.41 SHOULDER IMPINGEMENT SYNDROME, RIGHT: ICD-10-CM

## 2023-07-19 PROCEDURE — 97112 NEUROMUSCULAR REEDUCATION: CPT | Performed by: PHYSICAL THERAPIST

## 2023-07-19 PROCEDURE — 97140 MANUAL THERAPY 1/> REGIONS: CPT | Performed by: PHYSICAL THERAPIST

## 2023-07-19 PROCEDURE — 97110 THERAPEUTIC EXERCISES: CPT | Performed by: PHYSICAL THERAPIST

## 2023-07-19 NOTE — PROGRESS NOTES
Daily Note     Today's date: 2023  Patient name: Jazmine Nowak  : 1960  MRN: [id number]  Referring provider: Kenneth Bearden, *  Dx:   Encounter Diagnosis     ICD-10-CM    1. Right shoulder pain, unspecified chronicity  M25.511       2. Shoulder impingement syndrome, right  M75.41                      Subjective: Pt reports HEP going well. No setbacks. So far not much change, but anxious to continue making progress. Objective: See treatment diary below      Assessment: Tolerated treatment well. Patient demonstrated fatigue post treatment, exhibited good technique with therapeutic exercises and would benefit from continued PT      Plan: Continue per plan of care. Progress treatment as tolerated.          Precautions: None at this time    Daily Treatment Diary    HEP: Handout provided and discussed      Manuals 23     ART x15' x15' x15'     PROM/Stretch        IASTM        STM/Triggerpoint        JM                Neuro Re-Ed        Scap retract 2x10 2x10 2x10     No $ AROM 2x10 2x10 2x10     No $ into Palm up Abd AROM 2x10 2x10 2x10                                     Ther Ex        TB No $ 2x10 L2 3x10 L2 3x10 L2     TB 70 degree ABD 2x10 L2 2x10 L2 3x10 L2     SL ER, Abd  3x10 2#, 2# 3x10 2#, 2#     Prone Ys, Ts  2x10 2# 2x10 2#     TB Ys, Ts    This session    Prone retract with ER     This session                           Ther Activity                        Gait Training                        Modalities        MHP        CP x10' x10' x10'     US/Stim

## 2023-07-24 ENCOUNTER — APPOINTMENT (OUTPATIENT)
Dept: PHYSICAL THERAPY | Facility: CLINIC | Age: 63
End: 2023-07-24
Payer: MEDICARE

## 2023-07-26 ENCOUNTER — APPOINTMENT (OUTPATIENT)
Dept: PHYSICAL THERAPY | Facility: CLINIC | Age: 63
End: 2023-07-26
Payer: MEDICARE

## 2023-07-31 ENCOUNTER — OFFICE VISIT (OUTPATIENT)
Dept: PHYSICAL THERAPY | Facility: CLINIC | Age: 63
End: 2023-07-31
Payer: MEDICARE

## 2023-07-31 DIAGNOSIS — M75.41 SHOULDER IMPINGEMENT SYNDROME, RIGHT: ICD-10-CM

## 2023-07-31 DIAGNOSIS — M25.511 RIGHT SHOULDER PAIN, UNSPECIFIED CHRONICITY: Primary | ICD-10-CM

## 2023-07-31 PROCEDURE — 97112 NEUROMUSCULAR REEDUCATION: CPT | Performed by: PHYSICAL THERAPIST

## 2023-07-31 PROCEDURE — 97140 MANUAL THERAPY 1/> REGIONS: CPT | Performed by: PHYSICAL THERAPIST

## 2023-07-31 PROCEDURE — 97110 THERAPEUTIC EXERCISES: CPT | Performed by: PHYSICAL THERAPIST

## 2023-07-31 NOTE — PROGRESS NOTES
Daily Note     Today's date: 2023  Patient name: Kaylah Manrique  : 1960  MRN: [id number]  Referring provider: Danilo Reis, *  Dx:   Encounter Diagnosis     ICD-10-CM    1. Right shoulder pain, unspecified chronicity  M25.511       2. Shoulder impingement syndrome, right  M75.41                      Subjective: Pt reports HEP going well. No setbacks. Mild improvements overall thus far. Anxious to continue making progress. Objective: See treatment diary below      Assessment: Tolerated treatment well. Patient demonstrated fatigue post treatment, exhibited good technique with therapeutic exercises and would benefit from continued PT      Plan: Continue per plan of care. Progress treatment as tolerated.          Precautions: None at this time    Daily Treatment Diary    HEP: Handout provided and discussed      Manuals 23    ART x15' x15' x15' x15'    PROM/Stretch        IASTM        STM/Triggerpoint        JM                Neuro Re-Ed        Scap retract 2x10 2x10 2x10 2x10    No $ AROM 2x10 2x10 2x10 2x10    No $ into Palm up Abd AROM 2x10 2x10 2x10 2x10    Sleeper Stretch    8x20''                            Ther Ex        TB No $ 2x10 L2 3x10 L2 3x10 L2 3x10 L2    TB 70 degree ABD 2x10 L2 2x10 L2 3x10 L2 3x10 L2    SL ER, Abd  3x10 2#, 2# 3x10 2#, 2# 3x10 2#, 3#    Prone Ys, Ts  2x10 2# 2x10 2# 2x10 2#    TB Ys, Ts     This session   Prone retract with ER     This session                           Ther Activity                        Gait Training                        Modalities        MHP        CP x10' x10' x10' x10'    US/Stim

## 2023-08-02 ENCOUNTER — OFFICE VISIT (OUTPATIENT)
Dept: PHYSICAL THERAPY | Facility: CLINIC | Age: 63
End: 2023-08-02
Payer: MEDICARE

## 2023-08-02 DIAGNOSIS — M75.41 SHOULDER IMPINGEMENT SYNDROME, RIGHT: ICD-10-CM

## 2023-08-02 DIAGNOSIS — M25.511 RIGHT SHOULDER PAIN, UNSPECIFIED CHRONICITY: Primary | ICD-10-CM

## 2023-08-02 PROCEDURE — 97112 NEUROMUSCULAR REEDUCATION: CPT | Performed by: PHYSICAL THERAPIST

## 2023-08-02 PROCEDURE — 97110 THERAPEUTIC EXERCISES: CPT | Performed by: PHYSICAL THERAPIST

## 2023-08-02 PROCEDURE — 97140 MANUAL THERAPY 1/> REGIONS: CPT | Performed by: PHYSICAL THERAPIST

## 2023-08-02 NOTE — PROGRESS NOTES
Daily Note     Today's date: 2023  Patient name: Sveta Alegria  : 1960  MRN: [id number]  Referring provider: Marium Vásquez, *  Dx:   Encounter Diagnosis     ICD-10-CM    1. Right shoulder pain, unspecified chronicity  M25.511       2. Shoulder impingement syndrome, right  M75.41                      Subjective: Pt reports HEP going well. No setbacks. Mild to Mod improvements overall thus far. Anxious to continue making progress. Objective: See treatment diary below      Assessment: Tolerated treatment well. Patient demonstrated fatigue post treatment, exhibited good technique with therapeutic exercises and would benefit from continued PT      Plan: Continue per plan of care. Progress treatment as tolerated.          Precautions: None at this time    Daily Treatment Diary    HEP: Handout provided and discussed      Manuals 23   ART x15' x15' x15' x15' x15'   PROM/Stretch        IASTM        STM/Triggerpoint        JM                Neuro Re-Ed        Scap retract 2x10 2x10 2x10 2x10 2x10   No $ AROM 2x10 2x10 2x10 2x10 2x10   No $ into Palm up Abd AROM 2x10 2x10 2x10 2x10 2x10   Sleeper Stretch    8x20'' 8x20''                           Ther Ex        TB No $ 2x10 L2 3x10 L2 3x10 L2 3x10 L2 3x10 L2   TB 70 degree ABD 2x10 L2 2x10 L2 3x10 L2 3x10 L2 3x10 L2   SL ER, Abd  3x10 2#, 2# 3x10 2#, 2# 3x10 2#, 2# 3x10 2#, 2#   Prone Ys, Ts  2x10 2# 2x10 2# 2x10 2# 2x10 2#   TB Ys, Ts This session       Prone retract with ER This session                               Ther Activity                        Gait Training                        Modalities        MHP        CP x10' x10' x10' x10' x10'   US/Stim

## 2023-08-07 ENCOUNTER — OFFICE VISIT (OUTPATIENT)
Dept: PHYSICAL THERAPY | Facility: CLINIC | Age: 63
End: 2023-08-07
Payer: MEDICARE

## 2023-08-07 DIAGNOSIS — M75.41 SHOULDER IMPINGEMENT SYNDROME, RIGHT: ICD-10-CM

## 2023-08-07 DIAGNOSIS — M25.511 RIGHT SHOULDER PAIN, UNSPECIFIED CHRONICITY: Primary | ICD-10-CM

## 2023-08-07 PROCEDURE — 97140 MANUAL THERAPY 1/> REGIONS: CPT

## 2023-08-07 PROCEDURE — 97110 THERAPEUTIC EXERCISES: CPT

## 2023-08-07 PROCEDURE — 97112 NEUROMUSCULAR REEDUCATION: CPT

## 2023-08-07 NOTE — PROGRESS NOTES
Daily Note     Today's date: 2023  Patient name: Adrienne Figueroa  : 1960  MRN: [id number]  Referring provider: Clari Ge, *  Dx:   Encounter Diagnosis     ICD-10-CM    1. Right shoulder pain, unspecified chronicity  M25.511       2. Shoulder impingement syndrome, right  M75.41                      Subjective: Pt reports he cont to make progress and is pleased. Eager to cont to make progress. Objective: See treatment diary below      Assessment: Tolerated treatment well. Patient exhibited good technique with therapeutic exercises. Pt making cont gains with program. Cont to have mild discomfort with sleeper stretching and end range shoulder flx/abd. Plan: Continue per plan of care.       Precautions: None at this time    Daily Treatment Diary    HEP: Handout provided and discussed      Manuals 23   ART  x15' x15' x15' x15'   PROM/Stretch 10' JV       IASTM        STM/Triggerpoint        JM                Neuro Re-Ed        Scap retract 2x10 2x10 2x10 2x10 2x10   No $ AROM 2x10 2x10 2x10 2x10 2x10   No $ into Palm up Abd AROM 2x10 2x10 2x10 2x10 2x10   Sleeper Stretch "   8x20'' 8x20''                           Ther Ex        TB No $ 3x10 L2 3x10 L2 3x10 L2 3x10 L2 3x10 L2   TB 70 degree ABD 3x10 L2 2x10 L2 3x10 L2 3x10 L2 3x10 L2   SL ER, Abd 2# 3/10 ea 3x10 2#, 2# 3x10 2#, 2# 3x10 2#, 2# 3x10 2#, 2#   Prone Ys, Ts 2# 2/10 ea 2x10 2# 2x10 2# 2x10 2# 2x10 2#   TB Ys, Ts Next session       Prone retract with ER Next session       TB rowing L4 3/10                       Ther Activity                        Gait Training                        Modalities        MHP        CP x10' x10' x10' x10' x10'   US/Stim

## 2023-08-09 ENCOUNTER — OFFICE VISIT (OUTPATIENT)
Dept: PHYSICAL THERAPY | Facility: CLINIC | Age: 63
End: 2023-08-09
Payer: MEDICARE

## 2023-08-09 DIAGNOSIS — M75.41 SHOULDER IMPINGEMENT SYNDROME, RIGHT: ICD-10-CM

## 2023-08-09 DIAGNOSIS — M25.511 RIGHT SHOULDER PAIN, UNSPECIFIED CHRONICITY: Primary | ICD-10-CM

## 2023-08-09 PROCEDURE — 97112 NEUROMUSCULAR REEDUCATION: CPT | Performed by: PHYSICAL THERAPIST

## 2023-08-09 PROCEDURE — 97110 THERAPEUTIC EXERCISES: CPT | Performed by: PHYSICAL THERAPIST

## 2023-08-09 PROCEDURE — 97140 MANUAL THERAPY 1/> REGIONS: CPT | Performed by: PHYSICAL THERAPIST

## 2023-08-09 NOTE — PROGRESS NOTES
Daily Note     Today's date: 2023  Patient name: Landon Castañeda  : 1960  MRN: [id number]  Referring provider: Collette Escalona, *  Dx:   Encounter Diagnosis     ICD-10-CM    1. Right shoulder pain, unspecified chronicity  M25.511       2. Shoulder impingement syndrome, right  M75.41                      Subjective: Pt reports continued progress. No setbacks. HEP going well. Anxious to continue making progress. Objective: See treatment diary below      Assessment: Tolerated treatment well. Patient demonstrated fatigue post treatment, exhibited good technique with therapeutic exercises and would benefit from continued PT. Pt making cont gains with program.      Plan: Continue per plan of care. Progress treatment as tolerated.          Precautions: None at this time    Daily Treatment Diary    HEP: Handout provided and discussed      Manuals 23   ART  x15' x15' x15' x15'   PROM/Stretch 10' JV       IASTM        STM/Triggerpoint        JM                Neuro Re-Ed        Scap retract 2x10 2x10 2x10 2x10 2x10   No $ AROM 2x10 2x10 2x10 2x10 2x10   No $ into Palm up Abd AROM 2x10 2x10 2x10 2x10 2x10   Sleeper Stretch " 8x20''  8x20'' 8x20''                           Ther Ex        TB No $ 3x10 L2 3x10 L3 3x10 L2 3x10 L2 3x10 L2   TB 70 degree ABD 3x10 L2 2x10 L3 3x10 L2 3x10 L2 3x10 L2   SL ER, Abd 2# 3/10 ea 3x10 3#, 3# 3x10 2#, 2# 3x10 2#, 2# 3x10 2#, 2#   Prone Ys, Ts 2# 2/10 ea 2x10 3# 2x10 2# 2x10 2# 2x10 2#   TB Ys, Ts   This session     Prone retract with ER   This session     TB rowing L4 3/10 3x10 L5                      Ther Activity                        Gait Training                        Modalities        MHP        CP x10' x10' x10' x10' x10'   US/Stim

## 2023-08-14 ENCOUNTER — OFFICE VISIT (OUTPATIENT)
Dept: PHYSICAL THERAPY | Facility: CLINIC | Age: 63
End: 2023-08-14
Payer: MEDICARE

## 2023-08-14 DIAGNOSIS — M75.41 SHOULDER IMPINGEMENT SYNDROME, RIGHT: ICD-10-CM

## 2023-08-14 DIAGNOSIS — M25.511 RIGHT SHOULDER PAIN, UNSPECIFIED CHRONICITY: Primary | ICD-10-CM

## 2023-08-14 PROCEDURE — 97110 THERAPEUTIC EXERCISES: CPT | Performed by: PHYSICAL THERAPIST

## 2023-08-14 PROCEDURE — 97112 NEUROMUSCULAR REEDUCATION: CPT | Performed by: PHYSICAL THERAPIST

## 2023-08-14 PROCEDURE — 97140 MANUAL THERAPY 1/> REGIONS: CPT | Performed by: PHYSICAL THERAPIST

## 2023-08-14 NOTE — PROGRESS NOTES
Daily Note     Today's date: 2023  Patient name: Fatmata Vines  : 1960  MRN: [id number]  Referring provider: Gabriel Cervantes, *  Dx:   Encounter Diagnosis     ICD-10-CM    1. Right shoulder pain, unspecified chronicity  M25.511       2. Shoulder impingement syndrome, right  M75.41                      Subjective: Pt reports continued progress. No setbacks. HEP going well. Anxious to continue making progress. Objective: See treatment diary below      Assessment: Tolerated treatment well. Patient demonstrated fatigue post treatment, exhibited good technique with therapeutic exercises and would benefit from continued PT. Pt making cont gains with program.      Plan: Continue per plan of care. Progress treatment as tolerated.          Precautions: None at this time    Daily Treatment Diary    HEP: Handout provided and discussed      Manuals 23   ART  x15' x15' x15' x15'   PROM/Stretch 10' JV       IASTM        STM/Triggerpoint        JM                Neuro Re-Ed        Scap retract 2x10 2x10 2x10 2x10 2x10   No $ AROM 2x10 2x10 2x10 2x10 2x10   No $ into Palm up Abd AROM 2x10 2x10 2x10 2x10 2x10   Sleeper Stretch " 8x20'' 8x20'' 8x20'' 8x20''   3 Way Body Blade   4x20'' ea                     Ther Ex        TB No $ 3x10 L2 3x10 L3 3x10 L3 3x10 L2 3x10 L2   TB 70 degree ABD 3x10 L2 2x10 L3 3x10 L3 3x10 L2 3x10 L2   SL ER, Abd 2# 3/10 ea 3x10 3#, 3# 3x10 3#, 3# 3x10 2#, 2# 3x10 2#, 2#   Prone Ys, Ts 2# 2/10 ea 2x10 3# 2x10 3# 2x10 2# 2x10 2#   TB Ys, Ts   This session     Prone retract with ER   This session     TB rowing L4 3/10 3x10 L5 3x5 L5                     Ther Activity                        Gait Training                        Modalities        MHP        CP x10' x10' x10' x10' x10'   US/Stim

## 2023-08-16 ENCOUNTER — OFFICE VISIT (OUTPATIENT)
Dept: PHYSICAL THERAPY | Facility: CLINIC | Age: 63
End: 2023-08-16
Payer: MEDICARE

## 2023-08-16 DIAGNOSIS — M75.41 SHOULDER IMPINGEMENT SYNDROME, RIGHT: ICD-10-CM

## 2023-08-16 DIAGNOSIS — M25.511 RIGHT SHOULDER PAIN, UNSPECIFIED CHRONICITY: Primary | ICD-10-CM

## 2023-08-16 PROCEDURE — 97110 THERAPEUTIC EXERCISES: CPT | Performed by: PHYSICAL THERAPIST

## 2023-08-16 PROCEDURE — 97112 NEUROMUSCULAR REEDUCATION: CPT | Performed by: PHYSICAL THERAPIST

## 2023-08-16 PROCEDURE — 97140 MANUAL THERAPY 1/> REGIONS: CPT | Performed by: PHYSICAL THERAPIST

## 2023-08-16 NOTE — PROGRESS NOTES
Daily Note     Today's date: 2023  Patient name: Royanne Cheadle  : 1960  MRN: [id number]  Referring provider: Doug Badillo, *  Dx:   Encounter Diagnosis     ICD-10-CM    1. Right shoulder pain, unspecified chronicity  M25.511       2. Shoulder impingement syndrome, right  M75.41                      Subjective: Pt reports continued progress. No setbacks. Mobility, strength, and functional capacity continue to improve, despite intermittent sharp pain. HEP going well. Anxious to continue making progress. Objective: See treatment diary below      Assessment: Tolerated treatment well. Patient demonstrated fatigue post treatment, exhibited good technique with therapeutic exercises and would benefit from continued PT. Pt making cont gains with program.      Plan: Continue per plan of care. Progress treatment as tolerated.          Precautions: None at this time    Daily Treatment Diary    HEP: Handout provided and discussed      Manuals 23   ART  x15' x15' x15' x15'   PROM/Stretch 10' JV       IASTM        STM/Triggerpoint        JM                Neuro Re-Ed        Scap retract 2x10 2x10 2x10 2x10 2x10   No $ AROM 2x10 2x10 2x10 2x10 2x10   No $ into Palm up Abd AROM 2x10 2x10 2x10 2x10 2x10   Sleeper Stretch " 8x20'' 8x20'' 8x20'' 8x20''   3 Way Body Blade   4x20'' ea 4x20'' ea                    Ther Ex        TB No $ 3x10 L2 3x10 L3 3x10 L3 3x10 L3 3x10 L2   TB 70 degree ABD 3x10 L2 2x10 L3 3x10 L3 3x10 L3 3x10 L2   SL ER, Abd 2# 3/10 ea 3x10 3#, 3# 3x10 3#, 3# 3x10 3#, 3# 3x10 2#, 2#   Prone Ys, Ts 2# 2/10 ea 2x10 3# 2x10 3# 2x10 3# 2x10 2#   TB Ys, Ts   This session     Prone retract with ER   This session     TB rowing L4 3/10 3x10 L5 3x5 L5 3x5 L5                    Ther Activity                        Gait Training                        Modalities        MHP        CP x10' x10' x10' x10' x10'   US/Stim

## 2023-08-21 ENCOUNTER — APPOINTMENT (OUTPATIENT)
Dept: PHYSICAL THERAPY | Facility: CLINIC | Age: 63
End: 2023-08-21
Payer: MEDICARE

## 2023-08-23 ENCOUNTER — APPOINTMENT (OUTPATIENT)
Dept: PHYSICAL THERAPY | Facility: CLINIC | Age: 63
End: 2023-08-23
Payer: MEDICARE

## 2023-08-28 ENCOUNTER — APPOINTMENT (OUTPATIENT)
Dept: PHYSICAL THERAPY | Facility: CLINIC | Age: 63
End: 2023-08-28
Payer: MEDICARE

## 2023-08-30 ENCOUNTER — APPOINTMENT (OUTPATIENT)
Dept: PHYSICAL THERAPY | Facility: CLINIC | Age: 63
End: 2023-08-30
Payer: MEDICARE

## 2023-09-06 ENCOUNTER — APPOINTMENT (OUTPATIENT)
Dept: PHYSICAL THERAPY | Facility: CLINIC | Age: 63
End: 2023-09-06
Payer: MEDICARE

## 2023-09-12 ENCOUNTER — EVALUATION (OUTPATIENT)
Dept: PHYSICAL THERAPY | Facility: CLINIC | Age: 63
End: 2023-09-12
Payer: MEDICARE

## 2023-09-12 DIAGNOSIS — M25.511 RIGHT SHOULDER PAIN, UNSPECIFIED CHRONICITY: Primary | ICD-10-CM

## 2023-09-12 DIAGNOSIS — M75.41 SHOULDER IMPINGEMENT SYNDROME, RIGHT: ICD-10-CM

## 2023-09-12 PROCEDURE — 97140 MANUAL THERAPY 1/> REGIONS: CPT | Performed by: PHYSICAL THERAPIST

## 2023-09-12 PROCEDURE — 97110 THERAPEUTIC EXERCISES: CPT | Performed by: PHYSICAL THERAPIST

## 2023-09-12 PROCEDURE — 97112 NEUROMUSCULAR REEDUCATION: CPT | Performed by: PHYSICAL THERAPIST

## 2023-09-12 NOTE — PROGRESS NOTES
PT Re-Evaluation     Today's date: 2023  Patient name: Christin Mcbride  : 1960  MRN: [id number]  Referring provider: Neha Deshpande, *  Dx:   Encounter Diagnosis     ICD-10-CM    1. Right shoulder pain, unspecified chronicity  M25.511       2. Shoulder impingement syndrome, right  M75.41                      Assessment  Understanding of Dx/Px/POC: good   Prognosis: good    Goals  STGs: To be complete within 2-3 weeks (partially met)  - Decrease pain to < 2/10 at worst  - Increase AROM to WNL  - Increase strength to > 4+/5  - Improve postural awareness capacity to > 60min before deficit    LTGs: To be complete within 4 weeks (partially met)  - Able to repetitively complete all overhead activity without pain or limitation for increased safety and functional capacity with ADLs and work-related duty  - Able to repetitively complete all reaching activity without pain or limitation for increased safety and functional capacity with ADLs and work-related duty  - Able to repetitively complete all pushing/pulling activity without pain or limitation for increased safety and functional capacity with ADLs and work-related duty  - Able to complete all lifting/carrying activity without pain or limitation for increased safety and functional capacity with ADLs and work-related duty    Plan  Planned therapy interventions: manual therapy, neuromuscular re-education, patient education, postural training, self care, therapeutic exercise, therapeutic activities and home exercise program  Frequency: 2x week  Duration in weeks: 6       Upon completion of today's re-evaluation, as evidenced by present objective and subjective measures, Vasiliy's sx remain consistent with continued, fair-paced progress from R Shoulder subacromial impingement and biceps tendon/RTC tednonopathy secondary to postural dysfunction and improper functional movement mechanics.  Patient will benefit from continued skilled physical therapy to address current deficits. Subjective Evaluation    Patient Goals  Patient goals for therapy: increased strength, decreased pain and increased motion    Pain  Current pain ratin  At best pain ratin  At worst pain ratin  Location: R Shoulder         Pt reports he is about 60% improved overall. HEP continues to go well. Anxious to continue making progress.         Objective Pain level ranges 1-4/10  AROM: R Shoulder Abd 160 degrees, Flexion 160 degrees, ER 90 degrees, IR 40 degrees; L Shoulder WNL  Strength: R Shoulder Abd 4/5, Flex 4/5, ER 4+/5, IR 5/5  Postural Awareness: Poor (rounded shoulders, forward head)  Special Tests: (+) Hawkin's, (+) Empty Can, (+) O'lui's  FOTO: 76; GOAL: 66  Unable to complete overhead activity without pain and limitation, but improving  Unable to complete pushing/pulling activity without pain and limitation, but improving  Unable to complete lifting/carrying activity without pain and limitation, but improving  Unable to complete cross body/behind the back reaching activity without pain and limitation, but improving             Precautions: None at this time    Daily Treatment Diary    HEP: Handout provided and discussed      Manuals 23   ART  x15' x15' x15' x15'   PROM/Stretch 10' JV       IAS        STM/Triggerpoint        JM                Neuro Re-Ed        Scap retract 2x10 2x10 2x10 2x10 2x10   No $ AROM 2x10 2x10 2x10 2x10 2x10   No $ into Palm up Abd AROM 2x10 2x10 2x10 2x10 2x10   Sleeper Stretch " 8x20'' 8x20'' 8x20'' 8x20''   3 Way Body Blade   4x20'' ea 4x20'' ea 4x20'' ea                   Ther Ex        TB No $ 3x10 L2 3x10 L3 3x10 L3 3x10 L3 3x10 L3   TB 70 degree ABD 3x10 L2 2x10 L3 3x10 L3 3x10 L3 3x10 L3   SL ER, Abd 2# 3/10 ea 3x10 3#, 3# 3x10 3#, 3# 3x10 3#, 3# 3x10 3#, 3#   Prone Ys, Ts 2# 2/10 ea 2x10 3# 2x10 3# 2x10 3# 2x10 3#   TB Ys, Ts   This session     Prone retract with ER   This session     TB rowing L4 3/10 3x10 L5 3x5 L5 3x5 L5 3x5 L5                   Ther Activity                        Gait Training                        Modalities        MHP        CP x10' x10' x10' x10' x10'   US/Stim

## 2023-09-19 ENCOUNTER — OFFICE VISIT (OUTPATIENT)
Dept: PHYSICAL THERAPY | Facility: CLINIC | Age: 63
End: 2023-09-19
Payer: MEDICARE

## 2023-09-19 DIAGNOSIS — M25.511 RIGHT SHOULDER PAIN, UNSPECIFIED CHRONICITY: Primary | ICD-10-CM

## 2023-09-19 DIAGNOSIS — M75.41 SHOULDER IMPINGEMENT SYNDROME, RIGHT: ICD-10-CM

## 2023-09-19 PROCEDURE — 97110 THERAPEUTIC EXERCISES: CPT

## 2023-09-19 PROCEDURE — 97112 NEUROMUSCULAR REEDUCATION: CPT

## 2023-09-19 PROCEDURE — 97140 MANUAL THERAPY 1/> REGIONS: CPT

## 2023-09-19 NOTE — PROGRESS NOTES
Daily Note     Today's date: 2023  Patient name: Ashley Alonzo  : 1960  MRN: [id number]  Referring provider: Yesi Singh, *  Dx:   Encounter Diagnosis     ICD-10-CM    1. Right shoulder pain, unspecified chronicity  M25.511       2. Shoulder impingement syndrome, right  M75.41                      Subjective: Pt reports he is doing well and shoulder cont to improve. Objective: See treatment diary below      Assessment: Tolerated treatment well. Patient demonstrated fatigue post treatment and exhibited good technique with therapeutic exercises. Pt making cont gains with overall strength and [place] to exercises. Pt making cont gains with min c/o pain. Plan: Continue per plan of care.       Precautions: None at this time    Daily Treatment Diary    HEP: Handout provided and discussed      Manuals 23   ART  x15' x15' x15' x15'   PROM/Stretch 10' JV       IASTM        STM/Triggerpoint        JM                Neuro Re-Ed        Scap retract 2x10 2x10 2x10 2x10 2x10   No $ AROM 2x10 2x10 2x10 2x10 2x10   No $ into Palm up Abd AROM 2x10 2x10 2x10 2x10 2x10   Sleeper Stretch " 8x20'' 8x20'' 8x20'' 8x20''   3 Way Body Blade " ea  4x20'' ea 4x20'' ea 4x20'' ea                   Ther Ex        TB No $ 3x10 L3 3x10 L3 3x10 L3 3x10 L3 3x10 L3   TB 70 degree ABD 3x10 L3 2x10 L3 3x10 L3 3x10 L3 3x10 L3   SL ER, Abd 3# 3/10 ea 3x10 3#, 3# 3x10 3#, 3# 3x10 3#, 3# 3x10 3#, 3#   Prone Ys, Ts 3# 3/10 ea 2x10 3# 2x10 3# 2x10 3# 2x10 3#   TB Ys, Ts   This session     Prone retract with ER   This session     TB rowing L5 3/10 3x10 L5 3x5 L5 3x5 L5 3x5 L5                   Ther Activity                        Gait Training                        Modalities        MHP        CP  x10' x10' x10' x10'   US/Stim

## 2023-09-26 ENCOUNTER — OFFICE VISIT (OUTPATIENT)
Dept: PHYSICAL THERAPY | Facility: CLINIC | Age: 63
End: 2023-09-26
Payer: MEDICARE

## 2023-09-26 DIAGNOSIS — M25.511 RIGHT SHOULDER PAIN, UNSPECIFIED CHRONICITY: Primary | ICD-10-CM

## 2023-09-26 DIAGNOSIS — M75.41 SHOULDER IMPINGEMENT SYNDROME, RIGHT: ICD-10-CM

## 2023-09-26 PROCEDURE — 97110 THERAPEUTIC EXERCISES: CPT | Performed by: PHYSICAL THERAPIST

## 2023-09-26 PROCEDURE — 97112 NEUROMUSCULAR REEDUCATION: CPT | Performed by: PHYSICAL THERAPIST

## 2023-09-26 PROCEDURE — 97140 MANUAL THERAPY 1/> REGIONS: CPT | Performed by: PHYSICAL THERAPIST

## 2023-09-26 NOTE — PROGRESS NOTES
PT Discharge    Today's date: 2023  Patient name: Paula Buck  : 1960  MRN: [id number]  Referring provider: Hellen Hunt, *  Dx:   Encounter Diagnosis     ICD-10-CM    1. Right shoulder pain, unspecified chronicity  M25.511       2. Shoulder impingement syndrome, right  M75.41                      Assessment  Understanding of Dx/Px/POC: good   Prognosis: good    Goals  STGs: To be complete within 2-3 weeks (met)  - Decrease pain to < 2/10 at worst  - Increase AROM to WNL  - Increase strength to > 4+/5  - Improve postural awareness capacity to > 60min before deficit    LTGs: To be complete within 4 weeks (met)  - Able to repetitively complete all overhead activity without pain or limitation for increased safety and functional capacity with ADLs and work-related duty  - Able to repetitively complete all reaching activity without pain or limitation for increased safety and functional capacity with ADLs and work-related duty  - Able to repetitively complete all pushing/pulling activity without pain or limitation for increased safety and functional capacity with ADLs and work-related duty  - Able to complete all lifting/carrying activity without pain or limitation for increased safety and functional capacity with ADLs and work-related duty       Pt will be D/C from physical therapy after today's session, as he has achieved all personal and functional goals. Pt instructed to reach out with any questions/concerns/setbacks. Subjective Evaluation    Pain  Current pain ratin  At best pain ratin  At worst pain ratin  Location: R Shoulder         Pt reports he is about 80% improved since IE. Feels he has reached the point where he can transition to HEP. Will reach out with any questions/concerns/setbacks.         Objective Pain level ranges 0-2/10  AROM: R Shoulder Abd 170 degrees, Flexion 170 degrees, ER 90 degrees, IR 50 degrees; L Shoulder WNL  Strength: R Shoulder Abd 5/5, Flex 5/5, ER 5/5, IR 5/5  Postural Awareness: Fair (rounded shoulders, forward head)  Special Tests: (-) Hawkin's, (-) Empty Can, (-) O'lui's  FOTO: 76; GOAL: 66  Able to complete overhead activity without pain and limitation  Able to complete pushing/pulling activity without pain and limitation  Able to complete lifting/carrying activity without pain and limitation  Able to complete cross body/behind the back reaching activity without pain and limitation             Precautions: None at this time    Daily Treatment Diary    HEP: Handout provided and discussed      Manuals 9/19 9/26/23 8/14/23 8/16/23 9/12/23   ART  x15' x15' x15' x15'   PROM/Stretch 10' JV       IASTM        STM/Triggerpoint        JM                Neuro Re-Ed        Scap retract 2x10 2x10 2x10 2x10 2x10   No $ AROM 2x10 2x10 2x10 2x10 2x10   No $ into Palm up Abd AROM 2x10 2x10 2x10 2x10 2x10   Sleeper Stretch 8/20" 8x20'' 8x20'' 8x20'' 8x20''   3 Way Body Blade 4/20" ea 4x20'' ea 4x20'' ea 4x20'' ea 4x20'' ea                   Ther Ex        TB No $ 3x10 L3 3x10 L3 3x10 L3 3x10 L3 3x10 L3   TB 70 degree ABD 3x10 L3 2x10 L3 3x10 L3 3x10 L3 3x10 L3   SL ER, Abd 3# 3/10 ea 3x10 3#, 3# 3x10 3#, 3# 3x10 3#, 3# 3x10 3#, 3#   Prone Ys, Ts 3# 3/10 ea 2x10 3# 2x10 3# 2x10 3# 2x10 3#   TB Ys, Ts   This session     Prone retract with ER   This session     TB rowing L5 3/10 3x10 L5 3x5 L5 3x5 L5 3x5 L5                   Ther Activity                        Gait Training                        Modalities        MHP        CP  x10' x10' x10' x10'   US/Stim

## (undated) DEVICE — GLOVE INDICATOR PI UNDERGLOVE SZ 8 BLUE

## (undated) DEVICE — OCCLUSIVE GAUZE STRIP,3% BISMUTH TRIBROMOPHENATE IN PETROLATUM BLEND: Brand: XEROFORM

## (undated) DEVICE — 3M™ IOBAN™ 2 ANTIMICROBIAL INCISE DRAPE 6648EZ: Brand: IOBAN™ 2

## (undated) DEVICE — WEBRIL 6 IN UNSTERILE

## (undated) DEVICE — SUT MONOCRYL 3-0 PS-2 27 IN Y427H

## (undated) DEVICE — PADDING CAST 6IN COTTON STRL

## (undated) DEVICE — BETHLEHEM UNIV TOTAL KNEE, KIT: Brand: CARDINAL HEALTH

## (undated) DEVICE — 3M™ STERI-STRIP™ REINFORCED ADHESIVE SKIN CLOSURES, R1547, 1/2 IN X 4 IN (12 MM X 100 MM), 6 STRIPS/ENVELOPE: Brand: 3M™ STERI-STRIP™

## (undated) DEVICE — GAUZE SPONGES,16 PLY: Brand: CURITY

## (undated) DEVICE — INTENDED FOR TISSUE SEPARATION, AND OTHER PROCEDURES THAT REQUIRE A SHARP SURGICAL BLADE TO PUNCTURE OR CUT.: Brand: BARD-PARKER ® CARBON RIB-BACK BLADES

## (undated) DEVICE — PLUMEPEN PRO 10FT

## (undated) DEVICE — FLEXIBLE ADHESIVE BANDAGE,X-LARGE: Brand: CURITY

## (undated) DEVICE — SCD SEQUENTIAL COMPRESSION COMFORT SLEEVE MEDIUM KNEE LENGTH: Brand: KENDALL SCD

## (undated) DEVICE — SUT STRATAFIX SPIRAL PDS PLUS 1 CTX 18 IN SXPP1A400

## (undated) DEVICE — CAPIT KNEE ATTUNE RP CEMENT - DEPUY

## (undated) DEVICE — CUFF TOURNIQUET 30 X 4 IN QUICK CONNECT DISP 1BLA

## (undated) DEVICE — 3M™ DURAPORE™ SURGICAL TAPE 1538-3, 3 INCH X 10 YARD (7,5CM X 9,1M), 4 ROLLS/BOX: Brand: 3M™ DURAPORE™

## (undated) DEVICE — SAW BLADE RECIPROCATING 179

## (undated) DEVICE — GLOVE SRG BIOGEL 8

## (undated) DEVICE — 3M™ STERI-DRAPE™ U-DRAPE 1015: Brand: STERI-DRAPE™

## (undated) DEVICE — FRAZIER SUCTION INSTRUMENT 18 FR W/OBTURATOR, NO CONTROL VENT: Brand: FRAZIER

## (undated) DEVICE — SPONGE LAP 18 X 18 IN STRL RFD

## (undated) DEVICE — SAW BLADE OSCILLATING BRAZOL 167

## (undated) DEVICE — TRAY EPIDURAL PERIFIX 20GA X 3.5IN TUOHY 8ML

## (undated) DEVICE — ACE WRAP 6 IN UNSTERILE

## (undated) DEVICE — NEEDLE SPINAL 22G X 3.5IN  QUINCKE

## (undated) DEVICE — GLOVE INDICATOR PI UNDERGLOVE SZ 7 BLUE

## (undated) DEVICE — CHLORAPREP HI-LITE 10.5ML ORANGE

## (undated) DEVICE — SYRINGE 5ML LL

## (undated) DEVICE — SKIN MARKER DUAL TIP WITH RULER CAP, FLEXIBLE RULER AND LABELS: Brand: DEVON

## (undated) DEVICE — 4-PORT MANIFOLD: Brand: NEPTUNE 2

## (undated) DEVICE — HOOD: Brand: FLYTE, SURGICOOL

## (undated) DEVICE — CHLORAPREP HI-LITE 26ML ORANGE

## (undated) DEVICE — GLOVE SRG BIOGEL 6.5

## (undated) DEVICE — PREP SURGICAL PURPREP 26ML

## (undated) DEVICE — IV EXTENSION TUBING 33 IN

## (undated) DEVICE — 3000CC GUARDIAN II: Brand: GUARDIAN

## (undated) DEVICE — IMPERVIOUS STOCKINETTE: Brand: DEROYAL

## (undated) DEVICE — COBAN 6 IN STERILE

## (undated) DEVICE — TRAY FOLEY 16FR URIMETER SURESTEP

## (undated) DEVICE — CEMENT MIXING BOWL W/SPATULA

## (undated) DEVICE — THE SIMPULSE SOLO SYSTEM WITH ULTREX RETRACTABLE SPLASH SHIELD TIP: Brand: SIMPULSE SOLO

## (undated) DEVICE — SUT VICRYL 2-0 CT-1 36 IN J945H

## (undated) DEVICE — SUT VICRYL 1 CTX 36 IN J977H

## (undated) DEVICE — ABDOMINAL PAD: Brand: DERMACEA